# Patient Record
Sex: MALE | Race: BLACK OR AFRICAN AMERICAN | NOT HISPANIC OR LATINO | URBAN - METROPOLITAN AREA
[De-identification: names, ages, dates, MRNs, and addresses within clinical notes are randomized per-mention and may not be internally consistent; named-entity substitution may affect disease eponyms.]

---

## 2022-06-27 ENCOUNTER — OFFICE VISIT (OUTPATIENT)
Dept: UROLOGY | Facility: CLINIC | Age: 77
End: 2022-06-27

## 2022-06-27 VITALS
BODY MASS INDEX: 25.01 KG/M2 | WEIGHT: 165 LBS | HEIGHT: 68 IN | TEMPERATURE: 97.3 F | SYSTOLIC BLOOD PRESSURE: 117 MMHG | DIASTOLIC BLOOD PRESSURE: 74 MMHG | HEART RATE: 70 BPM

## 2022-06-27 DIAGNOSIS — R97.20 ELEVATED PROSTATE SPECIFIC ANTIGEN (PSA): Primary | ICD-10-CM

## 2022-06-27 DIAGNOSIS — N40.2 PROSTATIC NODULE: ICD-10-CM

## 2022-06-27 LAB
BILIRUB BLD-MCNC: NEGATIVE MG/DL
CLARITY, POC: CLEAR
COLOR UR: YELLOW
GLUCOSE UR STRIP-MCNC: NEGATIVE MG/DL
KETONES UR QL: NEGATIVE
LEUKOCYTE EST, POC: NEGATIVE
NITRITE UR-MCNC: NEGATIVE MG/ML
PH UR: 6 [PH] (ref 5–8)
PROT UR STRIP-MCNC: ABNORMAL MG/DL
RBC # UR STRIP: NEGATIVE /UL
SP GR UR: 1.02 (ref 1–1.03)
UROBILINOGEN UR QL: ABNORMAL

## 2022-06-27 PROCEDURE — 87081 CULTURE SCREEN ONLY: CPT | Performed by: UROLOGY

## 2022-06-27 PROCEDURE — 81002 URINALYSIS NONAUTO W/O SCOPE: CPT | Performed by: UROLOGY

## 2022-06-27 PROCEDURE — 99203 OFFICE O/P NEW LOW 30 MIN: CPT | Performed by: UROLOGY

## 2022-06-27 RX ORDER — TRAMADOL HYDROCHLORIDE 50 MG/1
100 TABLET ORAL 3 TIMES DAILY
COMMUNITY

## 2022-06-27 RX ORDER — CELECOXIB 200 MG/1
200 CAPSULE ORAL DAILY
COMMUNITY

## 2022-06-27 RX ORDER — DIPHENHYDRAMINE HCL 25 MG
25 CAPSULE ORAL NIGHTLY PRN
COMMUNITY

## 2022-06-27 RX ORDER — ATORVASTATIN CALCIUM 80 MG/1
80 TABLET, FILM COATED ORAL DAILY
COMMUNITY

## 2022-06-27 RX ORDER — GABAPENTIN 600 MG/1
600 TABLET ORAL 3 TIMES DAILY
COMMUNITY

## 2022-06-27 RX ORDER — TRAZODONE HYDROCHLORIDE 50 MG/1
50 TABLET ORAL DAILY PRN
COMMUNITY

## 2022-06-27 RX ORDER — ATORVASTATIN CALCIUM 10 MG/1
10 TABLET, FILM COATED ORAL DAILY
COMMUNITY
End: 2022-06-27 | Stop reason: SDUPTHER

## 2022-06-27 RX ORDER — DULOXETIN HYDROCHLORIDE 60 MG/1
60 CAPSULE, DELAYED RELEASE ORAL DAILY
COMMUNITY

## 2022-06-27 NOTE — PROGRESS NOTES
"Chief Complaint  Elevated PSA    Prostatic nodule    Exposure to agent orange in [place]    Subjective  No acute distress        Dwight Gooden presents to [facility] UROLOGY  History of Present Illness    76-year-old -American male is seen because of elevated PSA to 9.2 and the prostatic nodule.  Patient was exposed to agent orange in [place].  He has no history of prostate cancer in the family.  Patient has no voiding difficulties and AUA score is only 1/35.  No gross hematuria or dysuria.  Patient was injured in [place] war and is reluctant to have any operations and pain    Objective No acute distress  Vital Signs:   /74   Pulse 70   Temp 97.3 °F (36.3 °C)   Ht 172.7 cm (68\")   Wt 74.8 kg (165 lb)   BMI 25.09 kg/m²     Allergies   Allergen Reactions   • Augmentin [Amoxicillin-Pot Clavulanate] Swelling     lips      Past medical history:  has a past medical history of Chronic pain, Hyperlipidemia, and Neuropathy.   Past surgical history:  has a past surgical history that includes Hip surgery (Bilateral); Knee surgery (Left); Abdominal surgery; Coccyx fracture surgery; and Hand nerve graft (Left).  Personal history: family history includes Diabetes in his mother; Emphysema in his father.  Social history:  reports that he has quit smoking. He has never used smokeless tobacco. Drug use questions deferred to the physician. He reports that he does not drink alcohol.    Review of Systems    Please see past medical and surgical history.  Patient was injured in [place] war and has been through a lot of operations    Physical Exam  Constitutional:       General: He is not in acute distress.     Appearance: Normal appearance. He is normal weight. He is not ill-appearing or toxic-appearing.   HENT:      Head: Normocephalic and atraumatic.      Ears:      Comments: No hearing loss  Eyes:      Pupils: Pupils are equal, round, and reactive to light.   Cardiovascular:      Rate and Rhythm: " Normal rate and regular rhythm.      Pulses: Normal pulses.      Heart sounds: Normal heart sounds. No murmur heard.  Pulmonary:      Effort: Pulmonary effort is normal.      Breath sounds: Normal breath sounds. No rhonchi or rales.   Abdominal:      General: Abdomen is flat.      Palpations: Abdomen is soft. There is no mass.      Tenderness: There is no abdominal tenderness. There is no right CVA tenderness or left CVA tenderness.   Genitourinary:     Comments: Normal circumcised penis.    Right and left scrotum is normal.    Right and left testicle and epididymis is normal.    MARIAM.  Patient has nodule on the left side extending to the lower part of the prostate on the right side.  It is hard and very suspicious for prostate cancer  Musculoskeletal:      Cervical back: Normal range of motion and neck supple. No rigidity or tenderness.   Lymphadenopathy:      Cervical: No cervical adenopathy.   Skin:     General: Skin is warm.      Coloration: Skin is not jaundiced.   Neurological:      General: No focal deficit present.      Mental Status: He is alert and oriented to person, place, and time.      Motor: No weakness.      Gait: Gait normal.   Psychiatric:         Mood and Affect: Mood normal.         Behavior: Behavior normal.         Thought Content: Thought content normal.         Judgment: Judgment normal.        Result Review :                 Assessment and Plan    Diagnoses and all orders for this visit:    1. Elevated prostate specific antigen (PSA) (Primary)  -     POC Urinalysis Dipstick  -     Fluoroquinolone-resistant Gram-negative Juan [place] Detection Culture - Swab, Large Intestine, Rectum    2. Prostatic nodule    I have done anal swab on him.  This is to make sure we do not have Levaquin resistant organisms.  I have given him Percocet 5/325 mg and Valium 5 mg p.o. to take it before his procedure which is scheduled for 8 July.  Patient will also take a Fleet enema about 3 hours before the procedure.  Patient  is not going to drive and somebody will bring him here for prostatic biopsy.  I have discussed with him about the prostate biopsy     Brief Urine Lab Results  (Last result in the past 365 days)      Color   Clarity   Blood   Leuk Est   Nitrite   Protein   CREAT   Urine HCG        06/27/22 1417 Yellow   Clear   Negative   Negative   Negative   30 mg/dL                  Follow Up   No follow-ups on file.  Patient was given instructions and counseling regarding his condition or for health maintenance advice. Please see specific information pulled into the AVS if appropriate.     Leda Mandujano MD

## 2022-07-02 LAB
BACTERIA ANAL CULT: NORMAL
BACTERIA ANAL CULT: NORMAL

## 2022-07-08 ENCOUNTER — PROCEDURE VISIT (OUTPATIENT)
Dept: UROLOGY | Facility: CLINIC | Age: 77
End: 2022-07-08

## 2022-07-08 VITALS
HEIGHT: 68 IN | SYSTOLIC BLOOD PRESSURE: 130 MMHG | TEMPERATURE: 98.2 F | WEIGHT: 290 LBS | DIASTOLIC BLOOD PRESSURE: 80 MMHG | HEART RATE: 83 BPM | BODY MASS INDEX: 43.95 KG/M2

## 2022-07-08 DIAGNOSIS — R97.20 ELEVATED PROSTATE SPECIFIC ANTIGEN (PSA): Primary | ICD-10-CM

## 2022-07-08 DIAGNOSIS — N40.2 PROSTATIC NODULE: ICD-10-CM

## 2022-07-08 PROCEDURE — 88342 IMHCHEM/IMCYTCHM 1ST ANTB: CPT | Performed by: UROLOGY

## 2022-07-08 PROCEDURE — 88305 TISSUE EXAM BY PATHOLOGIST: CPT | Performed by: UROLOGY

## 2022-07-08 NOTE — PROGRESS NOTES
Date.  7/8/2022    Preop diagnosis.  Elevated PSA and prostatic nodule    Postop diagnosis same    Operation performed.  Ultrasound of prostate and ultrasound directed biopsies.    Report of the procedure.  Patient was placed in left lateral position and ultrasound was done in axial and sagittal plane.  Patient was given 80 mg of gentamicin IM before the procedure.    Total volume of the prostate is 9.45 cm³.  Height is 20.7 mm and width is 37.6 mm.  Length is 23.3 mm.    Patient has a hypoechoic lesion at the left base extending through the right side.  Height of the lesion is 5.15 mm and weight is 20.4 mm.  Patient has very small transitional zones.    I have not have injection of 1% Xylocaine and half with epinephrine and injected between seminal vesicles and prostate.  I went ahead did 3 biopsies of this hypoechoic lesion and then routine biopsies of left base, left mid left apex doing 2 each biopsies.  Then we went ahead did 2 biopsies each from right base, right mid and right apex.    Rectal examination was done and there was not much bleeding.  We terminated the procedure and will see the patient in about 10 days for the report.  He tolerated her procedure very well.  I am going to give him Cipro 500 mg twice daily for 3 days.  Patient is a [place]  and was injured during the war and he request Percocet for pain which was given to him for postop pain in case he needs it.  We gave him 8 tablets.

## 2022-07-13 LAB
CYTO UR: NORMAL
LAB AP CASE REPORT: NORMAL
LAB AP CLINICAL INFORMATION: NORMAL
LAB AP SPECIAL STAINS: NORMAL
PATH REPORT.FINAL DX SPEC: NORMAL
PATH REPORT.GROSS SPEC: NORMAL

## 2022-07-19 ENCOUNTER — OFFICE VISIT (OUTPATIENT)
Dept: UROLOGY | Facility: CLINIC | Age: 77
End: 2022-07-19

## 2022-07-19 VITALS
DIASTOLIC BLOOD PRESSURE: 82 MMHG | SYSTOLIC BLOOD PRESSURE: 124 MMHG | TEMPERATURE: 98.6 F | BODY MASS INDEX: 25.01 KG/M2 | HEIGHT: 68 IN | HEART RATE: 86 BPM | WEIGHT: 165 LBS

## 2022-07-19 DIAGNOSIS — R97.20 ELEVATED PROSTATE SPECIFIC ANTIGEN (PSA): ICD-10-CM

## 2022-07-19 DIAGNOSIS — C61 PROSTATE CANCER: Primary | ICD-10-CM

## 2022-07-19 LAB
BILIRUB BLD-MCNC: NEGATIVE MG/DL
CLARITY, POC: CLEAR
COLOR UR: YELLOW
GLUCOSE UR STRIP-MCNC: NEGATIVE MG/DL
KETONES UR QL: ABNORMAL
LEUKOCYTE EST, POC: NEGATIVE
NITRITE UR-MCNC: NEGATIVE MG/ML
PH UR: 6 [PH] (ref 5–8)
PROT UR STRIP-MCNC: ABNORMAL MG/DL
RBC # UR STRIP: ABNORMAL /UL
SP GR UR: 1.02 (ref 1–1.03)
UROBILINOGEN UR QL: NORMAL

## 2022-07-19 PROCEDURE — 99212 OFFICE O/P EST SF 10 MIN: CPT | Performed by: UROLOGY

## 2022-07-19 PROCEDURE — 81002 URINALYSIS NONAUTO W/O SCOPE: CPT | Performed by: UROLOGY

## 2022-07-19 NOTE — PROGRESS NOTES
"Chief Complaint  Here for prostate biopsy results    Prostate cancer    Subjective No problem after the biopsy of prostate except gross hematuria for a few days        Dwight Gooden presents to [facility] UROLOGY  History of Present Illness    76-year-old -American male had elevated PSA and prostatic nodule.  Biopsy was performed and patient does have [place] score 4+3 prostate cancer in several biopsies.  Patient was exposed to agent orange in [place].  Patient has no dysuria    Objective No acute distress  Vital Signs:   /82   Pulse 86   Temp 98.6 °F (37 °C) (Infrared)   Ht 172.7 cm (68\")   Wt 74.8 kg (165 lb)   BMI 25.09 kg/m²     Allergies   Allergen Reactions   • Augmentin [Amoxicillin-Pot Clavulanate] Swelling     lips      Past medical history:  has a past medical history of Chronic pain, Hyperlipidemia, Neuropathy, and Prostate cancer (HCC).   Past surgical history:  has a past surgical history that includes Hip surgery (Bilateral); Knee surgery (Left); Abdominal surgery; Coccyx fracture surgery; Hand nerve graft (Left); and Prostate biopsy.  Personal history: family history includes Diabetes in his mother; Emphysema in his father.  Social history:  reports that he has quit smoking. He has never used smokeless tobacco. Drug use questions deferred to the physician. He reports that he does not drink alcohol.    Review of Systems    Please see past medical and surgical history rest of the system is negative    Physical Exam  Constitutional:       General: He is not in acute distress.     Appearance: Normal appearance. He is normal weight. He is not ill-appearing or toxic-appearing.   HENT:      Head: Normocephalic and atraumatic.   Skin:     General: Skin is warm.      Coloration: Skin is not jaundiced.   Neurological:      General: No focal deficit present.      Mental Status: He is alert and oriented to person, place, and time.      Gait: Gait normal.   Psychiatric:    "      Mood and Affect: Mood normal.         Behavior: Behavior normal.         Thought Content: Thought content normal.         Judgment: Judgment normal.        Result Review :         Status: Final result     Visible to patient: No (not released)     Next appt: None     Dx: Elevated prostate specific antigen (PSA)    Specimen Information: A: Prostate; Tissue    B: Prostate; Tissue    C: Prostate; Tissue    D: Prostate; Tissue    E: Prostate; Tissue    F: Prostate; Tissue    G: Prostate; Tissue         0 Result Notes    Component    Case Report   Surgical Pathology Report                         Case: [id number]                                   Authorizing Provider:  Leda Mandujano MD  Collected:           07/08/2022 04:37 PM           Ordering Location:     [facility]     Received:            07/08/2022 04:37 PM                                  [facility]                                                                 Pathologist:           Sherrill Moreira DO                                                        Specimens:   1) - Prostate, left hypoecohic area x 3 samples                                                      2) - Prostate, left base x 2 samples                                                                 3) - Prostate, left mid x 2 samples                                                                  4) - Prostate, left apex x 2 samples                                                                 5) - Prostate, right base x 2 samples                                                                6) - Prostate, right mid x 2 samples                                                                 7) - Prostate, right apex x 2 samples                                                      Clinical Information    Elevated prostate specific antigen (PSA)   Final Diagnosis   1. Prostate gland,  left hypoechoic area , needle core biopsy:               - Adenocarcinoma,  Grade Group 2 (Dwale grade 3+4 = score of 7), in 3 of 3 cores, involving 100% of needle core tissue, and measuring 12 mm in length               - Percent pattern 4: 40 %        2. Prostate gland, left base, needle core biopsy:               - Adenocarcinoma, Grade Group 3 (Dwale grade 4+3 = score of 7), in 2 of 2 cores, involving 100% of needle core tissue, and measuring 10 mm in length               - Percent pattern 4: 80%        3. Prostate gland, left mid, needle core biopsy:               - Adenocarcinoma, Grade Group 3 (Dwale grade 4+3 = score of 7), in 2 of 2 cores, involving 95% of needle core tissue, and measuring 10 mm in length               - Percent pattern 4: 60%         4. Prostate gland, left apex, needle core biopsy:               - Adenocarcinoma, Grade Group 2 (Dwale grade 4+3 = score of 7), in 2 of 2 cores, involving 96% of needle core tissue, and measuring 15 mm in length               - Percent pattern 4: 70%        5. Prostate gland, right base, needle core biopsy:               - Adenocarcinoma, Grade Group 1 (Dwale grade 3+3 = score of 6), in 1 of 2 cores, involving 13% of needle core tissue, and measuring 3 mm in length         6. Prostate gland, right mid, needle core biopsy:               - Adenocarcinoma, Grade Group 2 (Vasu grade 3+4 = score of 7), in 2 of 2 cores, involving 50% of needle core tissue, and measuring 6 mm in length               - Percent pattern 4: 5%         7. Prostate gland, right apex, needle core biopsy:               - Adenocarcinoma, Grade Group 1 (Vasu grade 3+3 = score of 6), in 1 of 2 cores, involving 4% of needle core tissue, and measuring 1 mm in length      REMARKS: The above positive (malignant) diagnosis was called to Arabella in Dr. Mandujano's office at 13:23 EDT on 7/13/2022 by s.       Electronically signed by Sherrill Moreira DO on 7/13/2022 at 1416   Gross Description    1. Prostate.  Left hypoechoic area x3: Received in formalin are 3  needle core biopsies of white semitranslucent soft tissue measuring up to 1.5 cm in length and each measuring less than 0.1 cm in diameter.  All 1A-1B.        2. Prostate.  Left base x2 samples: Received in formalin are 2 needle core biopsies of white semitranslucent soft tissue measuring up to 1.4 cm in length and each measuring less than 0.1 cm in diameter.  All 2A.        3. Prostate.  Left mid x2 samples: Received in formalin are 2 needle core biopsies of white semitranslucent soft tissue measuring up to 1.4 cm in length and each measuring less than 0.1 cm in diameter.  All 3A.        4. Prostate.  Left apex x2 samples: Received in formalin are 2 needle core biopsies of white semitranslucent soft tissue measuring up to 2.2 cm in length and each measuring less than 0.1 cm in diameter.  All 4A.        5. Prostate.  Right base x2 samples: Received in formalin are 2 needle core biopsies of white semitranslucent soft tissue measuring up to 1.7 cm in length and each measuring less than 0.1 cm in diameter.  All 5A.        6. Prostate.  Right mid x2 samples.  Received in formalin are 2 needle core biopsies of white semitranslucent soft tissue measuring up to 1.7 cm in length and each measuring less than 0.1 cm in diameter.  All 6A.        7. Prostate.  Right apex x2 samples.  Received in formalin are 2 needle core biopsies of white semitranslucent soft tissue measuring up to 2 cm in length and each measuring less than 0.1 cm in diameter.  All 7A.  CRE         Special Stains    Immunostain for p63 is performed on block 5 a and demonstrates absence of myoepithelial cells surrounding invasive carcinoma.  All immunohistochemical/cytochemical stains (IHC) are performed on separate slides per different antibody unless otherwise specified in the documentation that a cocktail (multiple stain) was performed.  Controls are appropriate.      Microscopic Description    Comment:    Microscopic examination performed                  Assessment and Plan    Diagnoses and all orders for this visit:    1. Prostate cancer (HCC) (Primary)    2. Elevated prostate specific antigen (PSA)  -     POC Urinalysis Dipstick      I have discussed surgical treatment, radiation treatment for his prostate cancer.  He is going to go and talk to his PCP tomorrow.  I have given him copies of his pathology report.  Is going to think about it and let me know.  Brief Urine Lab Results  (Last result in the past 365 days)      Color   Clarity   Blood   Leuk Est   Nitrite   Protein   CREAT   Urine HCG        07/19/22 1601 Yellow   Clear   Trace   Negative   Negative   30 mg/dL                  Follow Up   No follow-ups on file.  Patient was given instructions and counseling regarding his condition or for health maintenance advice. Please see specific information pulled into the AVS if appropriate.     Leda Mandujano MD

## 2022-07-26 ENCOUNTER — OFFICE VISIT (OUTPATIENT)
Dept: UROLOGY | Facility: CLINIC | Age: 77
End: 2022-07-26

## 2022-07-26 VITALS
HEART RATE: 69 BPM | DIASTOLIC BLOOD PRESSURE: 77 MMHG | WEIGHT: 165 LBS | TEMPERATURE: 98.4 F | BODY MASS INDEX: 25.01 KG/M2 | SYSTOLIC BLOOD PRESSURE: 121 MMHG | HEIGHT: 68 IN

## 2022-07-26 DIAGNOSIS — Z77.098 AGENT ORANGE EXPOSURE: ICD-10-CM

## 2022-07-26 DIAGNOSIS — C61 PROSTATE CANCER: Primary | ICD-10-CM

## 2022-07-26 PROCEDURE — 99212 OFFICE O/P EST SF 10 MIN: CPT | Performed by: UROLOGY

## 2022-07-26 NOTE — PROGRESS NOTES
"Chief Complaint  Prostate Cancer    Subjective  No acute distress        Dwight Gooden presents to [facility] UROLOGY  History of Present Illness    Patient PSA is 9.2 and has Vasu score 7 prostate cancer.  His biopsy showed Vasu grade 4 +3 x 3, 3 +4 x 2 and 3 +3 x 2.  Patient is sexually active and he has a fiancé and wants to retain his erection.  He has done some studies and he understands that radical surgery will affect his erection.  We have discussed radiation and CyberKnife.    Patient was exposed to agent orange in [place]    Objective No acute distress  Vital Signs:   /77   Pulse 69   Temp 98.4 °F (36.9 °C)   Ht 172.7 cm (68\")   Wt 74.8 kg (165 lb)   BMI 25.09 kg/m²     Allergies   Allergen Reactions   • Augmentin [Amoxicillin-Pot Clavulanate] Swelling     lips      Past medical history:  has a past medical history of Chronic pain, Hyperlipidemia, Neuropathy, and Prostate cancer (HCC).   Past surgical history:  has a past surgical history that includes Hip surgery (Bilateral); Knee surgery (Left); Abdominal surgery; Coccyx fracture surgery; Hand nerve graft (Left); and Prostate biopsy.  Personal history: family history includes Diabetes in his mother; Emphysema in his father.  Social history:  reports that he has quit smoking. He has never used smokeless tobacco. Drug use questions deferred to the physician. He reports that he does not drink alcohol.    Review of Systems    No change from before    Physical Exam  Constitutional:       General: He is not in acute distress.     Appearance: Normal appearance. He is normal weight. He is not ill-appearing or toxic-appearing.   HENT:      Head: Normocephalic and atraumatic.      Ears:      Comments: No hearing loss  Musculoskeletal:         General: Normal range of motion.      Right lower leg: No edema.      Left lower leg: No edema.   Skin:     General: Skin is warm.      Coloration: Skin is not jaundiced.   Neurological: "      General: No focal deficit present.      Mental Status: He is alert and oriented to person, place, and time.      Motor: No weakness.      Gait: Gait normal.   Psychiatric:         Mood and Affect: Mood normal.         Behavior: Behavior normal.         Thought Content: Thought content normal.         Judgment: Judgment normal.        Result Review :                 Assessment and Plan    Diagnoses and all orders for this visit:    1. Prostate cancer (HCC) (Primary)    2. Agent orange exposure      Discussed radical prostatectomy or radiation.  Patient wanted know about brachytherapy and we discussed that also.  We discussed CyberKnife and patient is extremely interested in CyberKnife to preserve his reaction at least for some more time.  We will refer him to [facility] for CyberKnife.  Brief Urine Lab Results  (Last result in the past 365 days)      Color   Clarity   Blood   Leuk Est   Nitrite   Protein   CREAT   Urine HCG        07/19/22 1601 Yellow   Clear   Trace   Negative   Negative   30 mg/dL                  Follow Up   No follow-ups on file.  Patient was given instructions and counseling regarding his condition or for health maintenance advice. Please see specific information pulled into the AVS if appropriate.     Leda Mandujano MD

## 2023-08-21 ENCOUNTER — TRANSCRIBE ORDERS (OUTPATIENT)
Dept: ADMINISTRATIVE | Facility: HOSPITAL | Age: 78
End: 2023-08-21
Payer: OTHER GOVERNMENT

## 2023-08-21 DIAGNOSIS — Z13.820 ENCOUNTER FOR SCREENING FOR OSTEOPOROSIS: Primary | ICD-10-CM

## 2023-10-16 ENCOUNTER — HOSPITAL ENCOUNTER (OUTPATIENT)
Dept: BONE DENSITY | Facility: HOSPITAL | Age: 78
Discharge: HOME | End: 2023-10-16
Admitting: INTERNAL MEDICINE
Payer: MEDICARE

## 2023-10-16 DIAGNOSIS — Z13.820 ENCOUNTER FOR SCREENING FOR OSTEOPOROSIS: ICD-10-CM

## 2023-10-16 PROCEDURE — 77080 DXA BONE DENSITY AXIAL: CPT

## 2025-04-08 ENCOUNTER — HOSPITAL ENCOUNTER (EMERGENCY)
Facility: HOSPITAL | Age: 80
Discharge: HOME | End: 2025-04-08
Attending: EMERGENCY MEDICINE | Admitting: EMERGENCY MEDICINE
Payer: MEDICARE

## 2025-04-08 ENCOUNTER — APPOINTMENT (OUTPATIENT)
Dept: GENERAL RADIOLOGY | Facility: HOSPITAL | Age: 80
End: 2025-04-08
Payer: MEDICARE

## 2025-04-08 VITALS
RESPIRATION RATE: 18 BRPM | WEIGHT: 173.72 LBS | SYSTOLIC BLOOD PRESSURE: 111 MMHG | BODY MASS INDEX: 26.33 KG/M2 | OXYGEN SATURATION: 99 % | HEIGHT: 68 IN | TEMPERATURE: 98.9 F | DIASTOLIC BLOOD PRESSURE: 72 MMHG | HEART RATE: 87 BPM

## 2025-04-08 DIAGNOSIS — J98.8 VIRAL RESPIRATORY ILLNESS: Primary | ICD-10-CM

## 2025-04-08 DIAGNOSIS — B97.89 VIRAL RESPIRATORY ILLNESS: Primary | ICD-10-CM

## 2025-04-08 LAB
FLUAV RNA RESP QL NAA+PROBE: NOT DETECTED
FLUBV RNA RESP QL NAA+PROBE: NOT DETECTED
RSV RNA RESP QL NAA+PROBE: NOT DETECTED
SARS-COV-2 RNA RESP QL NAA+PROBE: NOT DETECTED

## 2025-04-08 PROCEDURE — 25010000002 DEXAMETHASONE SODIUM PHOSPHATE 10 MG/ML SOLUTION

## 2025-04-08 PROCEDURE — 96372 THER/PROPH/DIAG INJ SC/IM: CPT

## 2025-04-08 PROCEDURE — 87637 SARSCOV2&INF A&B&RSV AMP PRB: CPT | Performed by: EMERGENCY MEDICINE

## 2025-04-08 PROCEDURE — 71045 X-RAY EXAM CHEST 1 VIEW: CPT

## 2025-04-08 PROCEDURE — 99283 EMERGENCY DEPT VISIT LOW MDM: CPT

## 2025-04-08 RX ORDER — DEXAMETHASONE SODIUM PHOSPHATE 10 MG/ML
10 INJECTION, SOLUTION INTRAMUSCULAR; INTRAVENOUS ONCE
Status: COMPLETED | OUTPATIENT
Start: 2025-04-08 | End: 2025-04-08

## 2025-04-08 RX ORDER — METHYLPREDNISOLONE 4 MG/1
TABLET ORAL
Qty: 21 TABLET | Refills: 0 | Status: SHIPPED | OUTPATIENT
Start: 2025-04-08

## 2025-04-08 RX ADMIN — DEXAMETHASONE SODIUM PHOSPHATE 10 MG: 10 INJECTION INTRAMUSCULAR; INTRAVENOUS at 18:48
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
## 2025-05-29 ENCOUNTER — HOSPITAL ENCOUNTER (INPATIENT)
Facility: HOSPITAL | Age: 80
LOS: 7 days | Discharge: HOME | DRG: 378 | End: 2025-06-05
Attending: EMERGENCY MEDICINE | Admitting: HOSPITALIST
Payer: MEDICARE

## 2025-05-29 ENCOUNTER — APPOINTMENT (OUTPATIENT)
Dept: GENERAL RADIOLOGY | Facility: HOSPITAL | Age: 80
DRG: 378 | End: 2025-05-29
Payer: MEDICARE

## 2025-05-29 DIAGNOSIS — R26.2 DIFFICULTY IN WALKING: ICD-10-CM

## 2025-05-29 DIAGNOSIS — E86.1 HYPOTENSION DUE TO HYPOVOLEMIA: ICD-10-CM

## 2025-05-29 DIAGNOSIS — K92.2 ACUTE UPPER GI BLEED: ICD-10-CM

## 2025-05-29 DIAGNOSIS — Z78.9 DECREASED ACTIVITIES OF DAILY LIVING (ADL): ICD-10-CM

## 2025-05-29 DIAGNOSIS — D62 ACUTE BLOOD LOSS ANEMIA: Primary | ICD-10-CM

## 2025-05-29 LAB
ABO GROUP BLD: NORMAL
ABO GROUP BLD: NORMAL
ALBUMIN SERPL-MCNC: 3.6 G/DL (ref 3.5–5.2)
ALBUMIN/GLOB SERPL: 2.1 G/DL
ALP SERPL-CCNC: 41 U/L (ref 39–117)
ALT SERPL W P-5'-P-CCNC: 11 U/L (ref 1–41)
ANION GAP SERPL CALCULATED.3IONS-SCNC: 10.8 MMOL/L (ref 5–15)
AST SERPL-CCNC: 16 U/L (ref 1–40)
BASOPHILS # BLD AUTO: 0.01 10*3/MM3 (ref 0–0.2)
BASOPHILS NFR BLD AUTO: 0.1 % (ref 0–1.5)
BILIRUB SERPL-MCNC: 0.4 MG/DL (ref 0–1.2)
BILIRUB UR QL STRIP: NEGATIVE
BLD GP AB SCN SERPL QL: NEGATIVE
BUN SERPL-MCNC: 35.5 MG/DL (ref 8–23)
BUN/CREAT SERPL: 29.3 (ref 7–25)
CALCIUM SPEC-SCNC: 8.8 MG/DL (ref 8.6–10.5)
CHLORIDE SERPL-SCNC: 111 MMOL/L (ref 98–107)
CLARITY UR: CLEAR
CO2 SERPL-SCNC: 20.2 MMOL/L (ref 22–29)
COLOR UR: YELLOW
CREAT SERPL-MCNC: 1.21 MG/DL (ref 0.76–1.27)
DEPRECATED RDW RBC AUTO: 52.1 FL (ref 37–54)
EGFRCR SERPLBLD CKD-EPI 2021: 61.7 ML/MIN/1.73
EOSINOPHIL # BLD AUTO: 0.06 10*3/MM3 (ref 0–0.4)
EOSINOPHIL NFR BLD AUTO: 0.9 % (ref 0.3–6.2)
ERYTHROCYTE [DISTWIDTH] IN BLOOD BY AUTOMATED COUNT: 14.6 % (ref 12.3–15.4)
GEN 5 1HR TROPONIN T REFLEX: 12 NG/L
GLOBULIN UR ELPH-MCNC: 1.7 GM/DL
GLUCOSE SERPL-MCNC: 109 MG/DL (ref 65–99)
GLUCOSE UR STRIP-MCNC: NEGATIVE MG/DL
HCT VFR BLD AUTO: 15.7 % (ref 37.5–51)
HEMOCCULT STL QL IA: POSITIVE
HGB BLD-MCNC: 5 G/DL (ref 13–17.7)
HGB UR QL STRIP.AUTO: NEGATIVE
HOLD SPECIMEN: NORMAL
HOLD SPECIMEN: NORMAL
IMM GRANULOCYTES # BLD AUTO: 0.04 10*3/MM3 (ref 0–0.05)
IMM GRANULOCYTES NFR BLD AUTO: 0.6 % (ref 0–0.5)
INR PPP: 1.13 (ref 0.86–1.15)
KETONES UR QL STRIP: ABNORMAL
LEUKOCYTE ESTERASE UR QL STRIP.AUTO: NEGATIVE
LYMPHOCYTES # BLD AUTO: 1.35 10*3/MM3 (ref 0.7–3.1)
LYMPHOCYTES NFR BLD AUTO: 19.2 % (ref 19.6–45.3)
MAGNESIUM SERPL-MCNC: 2 MG/DL (ref 1.6–2.4)
MCH RBC QN AUTO: 31.3 PG (ref 26.6–33)
MCHC RBC AUTO-ENTMCNC: 31.8 G/DL (ref 31.5–35.7)
MCV RBC AUTO: 98.1 FL (ref 79–97)
MONOCYTES # BLD AUTO: 0.48 10*3/MM3 (ref 0.1–0.9)
MONOCYTES NFR BLD AUTO: 6.8 % (ref 5–12)
NEUTROPHILS NFR BLD AUTO: 5.08 10*3/MM3 (ref 1.7–7)
NEUTROPHILS NFR BLD AUTO: 72.4 % (ref 42.7–76)
NITRITE UR QL STRIP: NEGATIVE
NRBC BLD AUTO-RTO: 0 /100 WBC (ref 0–0.2)
PH UR STRIP.AUTO: 6.5 [PH] (ref 5–8)
PHOSPHATE SERPL-MCNC: 2.3 MG/DL (ref 2.5–4.5)
PLATELET # BLD AUTO: 165 10*3/MM3 (ref 140–450)
PMV BLD AUTO: 9.3 FL (ref 6–12)
POTASSIUM SERPL-SCNC: 3.6 MMOL/L (ref 3.5–5.2)
PROT SERPL-MCNC: 5.3 G/DL (ref 6–8.5)
PROT UR QL STRIP: NEGATIVE
PROTHROMBIN TIME: 15 SECONDS (ref 11.8–14.9)
RBC # BLD AUTO: 1.6 10*6/MM3 (ref 4.14–5.8)
RH BLD: POSITIVE
RH BLD: POSITIVE
SODIUM SERPL-SCNC: 142 MMOL/L (ref 136–145)
SP GR UR STRIP: 1.02 (ref 1–1.03)
T&S EXPIRATION DATE: NORMAL
TROPONIN T NUMERIC DELTA: -1 NG/L
TROPONIN T SERPL HS-MCNC: 13 NG/L
UROBILINOGEN UR QL STRIP: ABNORMAL
WBC NRBC COR # BLD AUTO: 7.02 10*3/MM3 (ref 3.4–10.8)
WHOLE BLOOD HOLD COAG: NORMAL
WHOLE BLOOD HOLD SPECIMEN: NORMAL

## 2025-05-29 PROCEDURE — 85025 COMPLETE CBC W/AUTO DIFF WBC: CPT | Performed by: EMERGENCY MEDICINE

## 2025-05-29 PROCEDURE — 84100 ASSAY OF PHOSPHORUS: CPT | Performed by: HOSPITALIST

## 2025-05-29 PROCEDURE — 93005 ELECTROCARDIOGRAM TRACING: CPT

## 2025-05-29 PROCEDURE — 86901 BLOOD TYPING SEROLOGIC RH(D): CPT

## 2025-05-29 PROCEDURE — 86850 RBC ANTIBODY SCREEN: CPT

## 2025-05-29 PROCEDURE — 93010 ELECTROCARDIOGRAM REPORT: CPT | Performed by: INTERNAL MEDICINE

## 2025-05-29 PROCEDURE — 99291 CRITICAL CARE FIRST HOUR: CPT

## 2025-05-29 PROCEDURE — 25810000003 SODIUM CHLORIDE 0.9 % SOLUTION: Performed by: EMERGENCY MEDICINE

## 2025-05-29 PROCEDURE — 86900 BLOOD TYPING SEROLOGIC ABO: CPT

## 2025-05-29 PROCEDURE — 86923 COMPATIBILITY TEST ELECTRIC: CPT

## 2025-05-29 PROCEDURE — 36430 TRANSFUSION BLD/BLD COMPNT: CPT

## 2025-05-29 PROCEDURE — 99222 1ST HOSP IP/OBS MODERATE 55: CPT | Performed by: HOSPITALIST

## 2025-05-29 PROCEDURE — 80053 COMPREHEN METABOLIC PANEL: CPT | Performed by: EMERGENCY MEDICINE

## 2025-05-29 PROCEDURE — 25010000002 OCTREOTIDE PER 25 MCG: Performed by: EMERGENCY MEDICINE

## 2025-05-29 PROCEDURE — 71045 X-RAY EXAM CHEST 1 VIEW: CPT

## 2025-05-29 PROCEDURE — P9016 RBC LEUKOCYTES REDUCED: HCPCS

## 2025-05-29 PROCEDURE — 82274 ASSAY TEST FOR BLOOD FECAL: CPT | Performed by: EMERGENCY MEDICINE

## 2025-05-29 PROCEDURE — 81003 URINALYSIS AUTO W/O SCOPE: CPT | Performed by: EMERGENCY MEDICINE

## 2025-05-29 PROCEDURE — 83735 ASSAY OF MAGNESIUM: CPT | Performed by: EMERGENCY MEDICINE

## 2025-05-29 PROCEDURE — 36415 COLL VENOUS BLD VENIPUNCTURE: CPT

## 2025-05-29 PROCEDURE — 93005 ELECTROCARDIOGRAM TRACING: CPT | Performed by: EMERGENCY MEDICINE

## 2025-05-29 PROCEDURE — 84484 ASSAY OF TROPONIN QUANT: CPT | Performed by: EMERGENCY MEDICINE

## 2025-05-29 PROCEDURE — 85610 PROTHROMBIN TIME: CPT | Performed by: EMERGENCY MEDICINE

## 2025-05-29 RX ORDER — BISACODYL 10 MG
10 SUPPOSITORY, RECTAL RECTAL DAILY PRN
Status: DISCONTINUED | OUTPATIENT
Start: 2025-05-29 | End: 2025-06-05 | Stop reason: HOSPADM

## 2025-05-29 RX ORDER — ACETAMINOPHEN 325 MG/1
650 TABLET ORAL EVERY 4 HOURS PRN
Status: DISCONTINUED | OUTPATIENT
Start: 2025-05-29 | End: 2025-06-05 | Stop reason: HOSPADM

## 2025-05-29 RX ORDER — NIFEDIPINE 30 MG
30 TABLET, EXTENDED RELEASE ORAL DAILY
COMMUNITY

## 2025-05-29 RX ORDER — PREDNISONE 5 MG/1
5 TABLET ORAL NIGHTLY
COMMUNITY

## 2025-05-29 RX ORDER — ACETAMINOPHEN 650 MG/1
650 SUPPOSITORY RECTAL EVERY 4 HOURS PRN
Status: DISCONTINUED | OUTPATIENT
Start: 2025-05-29 | End: 2025-06-05 | Stop reason: HOSPADM

## 2025-05-29 RX ORDER — PANTOPRAZOLE SODIUM 40 MG/10ML
80 INJECTION, POWDER, LYOPHILIZED, FOR SOLUTION INTRAVENOUS ONCE
Status: COMPLETED | OUTPATIENT
Start: 2025-05-29 | End: 2025-05-29

## 2025-05-29 RX ORDER — POLYETHYLENE GLYCOL 3350 17 G/17G
17 POWDER, FOR SOLUTION ORAL DAILY PRN
Status: DISCONTINUED | OUTPATIENT
Start: 2025-05-29 | End: 2025-06-05 | Stop reason: HOSPADM

## 2025-05-29 RX ORDER — ALBUTEROL SULFATE 90 UG/1
2 INHALANT RESPIRATORY (INHALATION) EVERY 4 HOURS PRN
Status: DISCONTINUED | OUTPATIENT
Start: 2025-05-29 | End: 2025-05-29

## 2025-05-29 RX ORDER — OXYCODONE AND ACETAMINOPHEN 5; 325 MG/1; MG/1
1 TABLET ORAL EVERY 6 HOURS PRN
Refills: 0 | Status: DISPENSED | OUTPATIENT
Start: 2025-05-29 | End: 2025-06-03

## 2025-05-29 RX ORDER — AMOXICILLIN 250 MG
2 CAPSULE ORAL 2 TIMES DAILY PRN
Status: DISCONTINUED | OUTPATIENT
Start: 2025-05-29 | End: 2025-06-05 | Stop reason: HOSPADM

## 2025-05-29 RX ORDER — ALBUTEROL SULFATE 0.83 MG/ML
2.5 SOLUTION RESPIRATORY (INHALATION) EVERY 6 HOURS PRN
Status: DISCONTINUED | OUTPATIENT
Start: 2025-05-29 | End: 2025-06-05 | Stop reason: HOSPADM

## 2025-05-29 RX ORDER — ONDANSETRON 4 MG/1
4 TABLET, ORALLY DISINTEGRATING ORAL EVERY 6 HOURS PRN
Status: DISCONTINUED | OUTPATIENT
Start: 2025-05-29 | End: 2025-06-05 | Stop reason: HOSPADM

## 2025-05-29 RX ORDER — ONDANSETRON 2 MG/ML
4 INJECTION INTRAMUSCULAR; INTRAVENOUS EVERY 6 HOURS PRN
Status: DISCONTINUED | OUTPATIENT
Start: 2025-05-29 | End: 2025-06-05 | Stop reason: HOSPADM

## 2025-05-29 RX ORDER — BISACODYL 5 MG/1
5 TABLET, DELAYED RELEASE ORAL DAILY PRN
Status: DISCONTINUED | OUTPATIENT
Start: 2025-05-29 | End: 2025-06-05 | Stop reason: HOSPADM

## 2025-05-29 RX ORDER — CELECOXIB 100 MG/1
200 CAPSULE ORAL DAILY
Status: DISCONTINUED | OUTPATIENT
Start: 2025-05-30 | End: 2025-06-03

## 2025-05-29 RX ORDER — ABIRATERONE ACETATE 250 MG/1
1000 TABLET ORAL NIGHTLY
COMMUNITY

## 2025-05-29 RX ORDER — OCTREOTIDE ACETATE 50 UG/ML
50 INJECTION, SOLUTION INTRAVENOUS; SUBCUTANEOUS ONCE
Status: COMPLETED | OUTPATIENT
Start: 2025-05-29 | End: 2025-05-29

## 2025-05-29 RX ORDER — SODIUM CHLORIDE 0.9 % (FLUSH) 0.9 %
10 SYRINGE (ML) INJECTION EVERY 12 HOURS SCHEDULED
Status: DISCONTINUED | OUTPATIENT
Start: 2025-05-29 | End: 2025-06-05 | Stop reason: HOSPADM

## 2025-05-29 RX ORDER — SODIUM CHLORIDE 0.9 % (FLUSH) 0.9 %
10 SYRINGE (ML) INJECTION AS NEEDED
Status: DISCONTINUED | OUTPATIENT
Start: 2025-05-29 | End: 2025-06-05 | Stop reason: HOSPADM

## 2025-05-29 RX ORDER — NALOXONE HCL 0.4 MG/ML
0.4 VIAL (ML) INJECTION
Status: DISCONTINUED | OUTPATIENT
Start: 2025-05-29 | End: 2025-06-05 | Stop reason: HOSPADM

## 2025-05-29 RX ORDER — OXYCODONE AND ACETAMINOPHEN 5; 325 MG/1; MG/1
1 TABLET ORAL EVERY 8 HOURS PRN
Status: DISCONTINUED | OUTPATIENT
Start: 2025-05-29 | End: 2025-06-03 | Stop reason: SDUPTHER

## 2025-05-29 RX ORDER — SODIUM CHLORIDE 9 MG/ML
40 INJECTION, SOLUTION INTRAVENOUS AS NEEDED
Status: DISCONTINUED | OUTPATIENT
Start: 2025-05-29 | End: 2025-06-05 | Stop reason: HOSPADM

## 2025-05-29 RX ORDER — ACETAMINOPHEN 160 MG/5ML
650 SOLUTION ORAL EVERY 4 HOURS PRN
Status: DISCONTINUED | OUTPATIENT
Start: 2025-05-29 | End: 2025-06-05 | Stop reason: HOSPADM

## 2025-05-29 RX ADMIN — OXYCODONE HYDROCHLORIDE AND ACETAMINOPHEN 1 TABLET: 5; 325 TABLET ORAL at 19:51

## 2025-05-29 RX ADMIN — SODIUM CHLORIDE 1000 ML: 9 INJECTION, SOLUTION INTRAVENOUS at 16:26

## 2025-05-29 RX ADMIN — OCTREOTIDE ACETATE 50 MCG: 50 INJECTION, SOLUTION INTRAVENOUS; SUBCUTANEOUS at 17:44

## 2025-05-29 RX ADMIN — OCTREOTIDE ACETATE 25 MCG/HR: 500 INJECTION, SOLUTION INTRAVENOUS; SUBCUTANEOUS at 17:56

## 2025-05-29 RX ADMIN — PANTOPRAZOLE SODIUM 80 MG: 40 INJECTION, POWDER, FOR SOLUTION INTRAVENOUS at 17:40

## 2025-05-29 RX ADMIN — Medication 10 ML: at 21:54
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
## 2025-05-29 NOTE — ED TRIAGE NOTES
Patient to ED from home with SOB upon exertion that causes him to feel dizzy.  He also reports blood in his stool.
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
## 2025-05-30 ENCOUNTER — ANESTHESIA (OUTPATIENT)
Dept: GASTROENTEROLOGY | Facility: HOSPITAL | Age: 80
End: 2025-05-30
Payer: MEDICARE

## 2025-05-30 ENCOUNTER — ANESTHESIA EVENT (OUTPATIENT)
Dept: GASTROENTEROLOGY | Facility: HOSPITAL | Age: 80
End: 2025-05-30
Payer: MEDICARE

## 2025-05-30 PROBLEM — D62 ACUTE BLOOD LOSS ANEMIA: Status: ACTIVE | Noted: 2025-05-29

## 2025-05-30 LAB
ANION GAP SERPL CALCULATED.3IONS-SCNC: 8.1 MMOL/L (ref 5–15)
BASOPHILS # BLD AUTO: 0.04 10*3/MM3 (ref 0–0.2)
BASOPHILS NFR BLD AUTO: 0.6 % (ref 0–1.5)
BUN SERPL-MCNC: 29.9 MG/DL (ref 8–23)
BUN/CREAT SERPL: 26.5 (ref 7–25)
CALCIUM SPEC-SCNC: 7.4 MG/DL (ref 8.6–10.5)
CHLORIDE SERPL-SCNC: 116 MMOL/L (ref 98–107)
CO2 SERPL-SCNC: 16.9 MMOL/L (ref 22–29)
CREAT SERPL-MCNC: 1.13 MG/DL (ref 0.76–1.27)
DEPRECATED RDW RBC AUTO: 49.8 FL (ref 37–54)
EGFRCR SERPLBLD CKD-EPI 2021: 66.9 ML/MIN/1.73
EOSINOPHIL # BLD AUTO: 0.22 10*3/MM3 (ref 0–0.4)
EOSINOPHIL NFR BLD AUTO: 3.2 % (ref 0.3–6.2)
ERYTHROCYTE [DISTWIDTH] IN BLOOD BY AUTOMATED COUNT: 15.2 % (ref 12.3–15.4)
GLUCOSE SERPL-MCNC: 145 MG/DL (ref 65–99)
HCT VFR BLD AUTO: 22.4 % (ref 37.5–51)
HGB BLD-MCNC: 7.4 G/DL (ref 13–17.7)
IMM GRANULOCYTES # BLD AUTO: 0.04 10*3/MM3 (ref 0–0.05)
IMM GRANULOCYTES NFR BLD AUTO: 0.6 % (ref 0–0.5)
LYMPHOCYTES # BLD AUTO: 1.73 10*3/MM3 (ref 0.7–3.1)
LYMPHOCYTES NFR BLD AUTO: 25.1 % (ref 19.6–45.3)
MCH RBC QN AUTO: 30 PG (ref 26.6–33)
MCHC RBC AUTO-ENTMCNC: 33 G/DL (ref 31.5–35.7)
MCV RBC AUTO: 90.7 FL (ref 79–97)
MONOCYTES # BLD AUTO: 0.65 10*3/MM3 (ref 0.1–0.9)
MONOCYTES NFR BLD AUTO: 9.4 % (ref 5–12)
NEUTROPHILS NFR BLD AUTO: 4.22 10*3/MM3 (ref 1.7–7)
NEUTROPHILS NFR BLD AUTO: 61.1 % (ref 42.7–76)
NRBC BLD AUTO-RTO: 0.3 /100 WBC (ref 0–0.2)
PHOSPHATE SERPL-MCNC: 2.2 MG/DL (ref 2.5–4.5)
PLATELET # BLD AUTO: 123 10*3/MM3 (ref 140–450)
PMV BLD AUTO: 9.8 FL (ref 6–12)
POTASSIUM SERPL-SCNC: 3.7 MMOL/L (ref 3.5–5.2)
QT INTERVAL: 325 MS
QTC INTERVAL: 421 MS
RBC # BLD AUTO: 2.47 10*6/MM3 (ref 4.14–5.8)
SODIUM SERPL-SCNC: 141 MMOL/L (ref 136–145)
WBC NRBC COR # BLD AUTO: 6.9 10*3/MM3 (ref 3.4–10.8)

## 2025-05-30 PROCEDURE — 25010000003 DEXTROSE 5 % SOLUTION: Performed by: HOSPITALIST

## 2025-05-30 PROCEDURE — 84100 ASSAY OF PHOSPHORUS: CPT | Performed by: HOSPITALIST

## 2025-05-30 PROCEDURE — 86900 BLOOD TYPING SEROLOGIC ABO: CPT

## 2025-05-30 PROCEDURE — 43255 EGD CONTROL BLEEDING ANY: CPT | Performed by: INTERNAL MEDICINE

## 2025-05-30 PROCEDURE — 94799 UNLISTED PULMONARY SVC/PX: CPT

## 2025-05-30 PROCEDURE — 25010000002 FAMOTIDINE 10 MG/ML SOLUTION

## 2025-05-30 PROCEDURE — 85025 COMPLETE CBC W/AUTO DIFF WBC: CPT | Performed by: HOSPITALIST

## 2025-05-30 PROCEDURE — 99232 SBSQ HOSP IP/OBS MODERATE 35: CPT | Performed by: HOSPITALIST

## 2025-05-30 PROCEDURE — 0DB78ZX EXCISION OF STOMACH, PYLORUS, VIA NATURAL OR ARTIFICIAL OPENING ENDOSCOPIC, DIAGNOSTIC: ICD-10-PCS | Performed by: INTERNAL MEDICINE

## 2025-05-30 PROCEDURE — 94761 N-INVAS EAR/PLS OXIMETRY MLT: CPT

## 2025-05-30 PROCEDURE — 25010000002 PROPOFOL 10 MG/ML EMULSION: Performed by: NURSE ANESTHETIST, CERTIFIED REGISTERED

## 2025-05-30 PROCEDURE — 86923 COMPATIBILITY TEST ELECTRIC: CPT

## 2025-05-30 PROCEDURE — 25010000002 LIDOCAINE PF 2% 2 % SOLUTION: Performed by: NURSE ANESTHETIST, CERTIFIED REGISTERED

## 2025-05-30 PROCEDURE — 43239 EGD BIOPSY SINGLE/MULTIPLE: CPT | Performed by: INTERNAL MEDICINE

## 2025-05-30 PROCEDURE — P9016 RBC LEUKOCYTES REDUCED: HCPCS

## 2025-05-30 PROCEDURE — 25810000003 LACTATED RINGERS PER 1000 ML: Performed by: NURSE ANESTHETIST, CERTIFIED REGISTERED

## 2025-05-30 PROCEDURE — 80048 BASIC METABOLIC PNL TOTAL CA: CPT | Performed by: HOSPITALIST

## 2025-05-30 PROCEDURE — 25010000002 PROCHLORPERAZINE 10 MG/2ML SOLUTION: Performed by: HOSPITALIST

## 2025-05-30 PROCEDURE — 25010000002 HYDROMORPHONE 1 MG/ML SOLUTION: Performed by: HOSPITALIST

## 2025-05-30 PROCEDURE — 36430 TRANSFUSION BLD/BLD COMPNT: CPT

## 2025-05-30 PROCEDURE — 25810000003 SODIUM CHLORIDE 0.9 % SOLUTION: Performed by: HOSPITALIST

## 2025-05-30 PROCEDURE — 25010000002 ONDANSETRON PER 1 MG: Performed by: HOSPITALIST

## 2025-05-30 PROCEDURE — 25010000002 ONDANSETRON PER 1 MG

## 2025-05-30 PROCEDURE — 99222 1ST HOSP IP/OBS MODERATE 55: CPT | Performed by: INTERNAL MEDICINE

## 2025-05-30 PROCEDURE — 25010000002 PHENYLEPHRINE HCL-NACL 1000-0.9 MCG/10ML-% SOLUTION PREFILLED SYRINGE: Performed by: NURSE ANESTHETIST, CERTIFIED REGISTERED

## 2025-05-30 PROCEDURE — 25010000002 MORPHINE PER 10 MG: Performed by: HOSPITALIST

## 2025-05-30 PROCEDURE — 0W3P8ZZ CONTROL BLEEDING IN GASTROINTESTINAL TRACT, VIA NATURAL OR ARTIFICIAL OPENING ENDOSCOPIC: ICD-10-PCS | Performed by: INTERNAL MEDICINE

## 2025-05-30 PROCEDURE — 25010000002 GLUCAGON (RDNA) PER 1 MG: Performed by: NURSE ANESTHETIST, CERTIFIED REGISTERED

## 2025-05-30 PROCEDURE — 88305 TISSUE EXAM BY PATHOLOGIST: CPT | Performed by: INTERNAL MEDICINE

## 2025-05-30 DEVICE — DEV CLIP ENDO RESOLUTION360 CONTRL ROT 235CM: Type: IMPLANTABLE DEVICE | Site: DUODENUM | Status: FUNCTIONAL

## 2025-05-30 RX ORDER — SODIUM CHLORIDE, SODIUM LACTATE, POTASSIUM CHLORIDE, CALCIUM CHLORIDE 600; 310; 30; 20 MG/100ML; MG/100ML; MG/100ML; MG/100ML
INJECTION, SOLUTION INTRAVENOUS CONTINUOUS PRN
Status: DISCONTINUED | OUTPATIENT
Start: 2025-05-30 | End: 2025-05-30 | Stop reason: SURG

## 2025-05-30 RX ORDER — MORPHINE SULFATE 2 MG/ML
1 INJECTION, SOLUTION INTRAMUSCULAR; INTRAVENOUS
Status: DISPENSED | OUTPATIENT
Start: 2025-05-30 | End: 2025-06-04

## 2025-05-30 RX ORDER — PROCHLORPERAZINE MALEATE 5 MG/1
5 TABLET ORAL EVERY 6 HOURS PRN
Status: DISCONTINUED | OUTPATIENT
Start: 2025-05-30 | End: 2025-06-05 | Stop reason: HOSPADM

## 2025-05-30 RX ORDER — IBUPROFEN 600 MG/1
TABLET ORAL AS NEEDED
Status: DISCONTINUED | OUTPATIENT
Start: 2025-05-30 | End: 2025-05-30 | Stop reason: SURG

## 2025-05-30 RX ORDER — PHENYLEPHRINE HCL IN 0.9% NACL 1 MG/10 ML
SYRINGE (ML) INTRAVENOUS AS NEEDED
Status: DISCONTINUED | OUTPATIENT
Start: 2025-05-30 | End: 2025-05-30 | Stop reason: SURG

## 2025-05-30 RX ORDER — PANTOPRAZOLE SODIUM 40 MG/10ML
40 INJECTION, POWDER, LYOPHILIZED, FOR SOLUTION INTRAVENOUS EVERY 12 HOURS SCHEDULED
Status: DISCONTINUED | OUTPATIENT
Start: 2025-05-30 | End: 2025-06-05 | Stop reason: HOSPADM

## 2025-05-30 RX ORDER — PROCHLORPERAZINE 25 MG
25 SUPPOSITORY, RECTAL RECTAL EVERY 12 HOURS PRN
Status: DISCONTINUED | OUTPATIENT
Start: 2025-05-30 | End: 2025-06-05 | Stop reason: HOSPADM

## 2025-05-30 RX ORDER — FAMOTIDINE 10 MG/ML
20 INJECTION, SOLUTION INTRAVENOUS ONCE
Status: COMPLETED | OUTPATIENT
Start: 2025-05-30 | End: 2025-05-30

## 2025-05-30 RX ORDER — PROCHLORPERAZINE EDISYLATE 5 MG/ML
5 INJECTION INTRAMUSCULAR; INTRAVENOUS EVERY 6 HOURS PRN
Status: DISCONTINUED | OUTPATIENT
Start: 2025-05-30 | End: 2025-06-05 | Stop reason: HOSPADM

## 2025-05-30 RX ORDER — PROPOFOL 10 MG/ML
VIAL (ML) INTRAVENOUS AS NEEDED
Status: DISCONTINUED | OUTPATIENT
Start: 2025-05-30 | End: 2025-05-30 | Stop reason: SURG

## 2025-05-30 RX ORDER — LIDOCAINE HYDROCHLORIDE 20 MG/ML
INJECTION, SOLUTION EPIDURAL; INFILTRATION; INTRACAUDAL; PERINEURAL AS NEEDED
Status: DISCONTINUED | OUTPATIENT
Start: 2025-05-30 | End: 2025-05-30 | Stop reason: SURG

## 2025-05-30 RX ORDER — ONDANSETRON 2 MG/ML
4 INJECTION INTRAMUSCULAR; INTRAVENOUS ONCE
Status: COMPLETED | OUTPATIENT
Start: 2025-05-30 | End: 2025-05-30

## 2025-05-30 RX ADMIN — LIDOCAINE HYDROCHLORIDE 60 MG: 20 INJECTION, SOLUTION INTRAVENOUS at 11:31

## 2025-05-30 RX ADMIN — PROPOFOL 20 MG: 10 INJECTION, EMULSION INTRAVENOUS at 11:41

## 2025-05-30 RX ADMIN — FAMOTIDINE 20 MG: 10 INJECTION INTRAVENOUS at 11:18

## 2025-05-30 RX ADMIN — ONDANSETRON 4 MG: 2 INJECTION INTRAMUSCULAR; INTRAVENOUS at 05:22

## 2025-05-30 RX ADMIN — Medication 10 ML: at 20:42

## 2025-05-30 RX ADMIN — GLUCAGON 0.5 MG: KIT at 11:37

## 2025-05-30 RX ADMIN — SODIUM CHLORIDE, POTASSIUM CHLORIDE, SODIUM LACTATE AND CALCIUM CHLORIDE: 600; 310; 30; 20 INJECTION, SOLUTION INTRAVENOUS at 11:18

## 2025-05-30 RX ADMIN — SODIUM CHLORIDE, SODIUM LACTATE, POTASSIUM CHLORIDE, CALCIUM CHLORIDE: 600; 310; 30; 20 INJECTION, SOLUTION INTRAVENOUS at 11:27

## 2025-05-30 RX ADMIN — OXYCODONE HYDROCHLORIDE AND ACETAMINOPHEN 1 TABLET: 5; 325 TABLET ORAL at 22:04

## 2025-05-30 RX ADMIN — OXYCODONE HYDROCHLORIDE AND ACETAMINOPHEN 1 TABLET: 5; 325 TABLET ORAL at 03:50

## 2025-05-30 RX ADMIN — HYDROMORPHONE HYDROCHLORIDE 0.5 MG: 1 INJECTION, SOLUTION INTRAMUSCULAR; INTRAVENOUS; SUBCUTANEOUS at 07:43

## 2025-05-30 RX ADMIN — MORPHINE SULFATE 1 MG: 2 INJECTION, SOLUTION INTRAMUSCULAR; INTRAVENOUS at 20:37

## 2025-05-30 RX ADMIN — HYDROMORPHONE HYDROCHLORIDE 0.5 MG: 1 INJECTION, SOLUTION INTRAMUSCULAR; INTRAVENOUS; SUBCUTANEOUS at 00:10

## 2025-05-30 RX ADMIN — SODIUM CHLORIDE 8 MG/HR: 9 INJECTION, SOLUTION INTRAVENOUS at 05:57

## 2025-05-30 RX ADMIN — ONDANSETRON 4 MG: 2 INJECTION INTRAMUSCULAR; INTRAVENOUS at 11:18

## 2025-05-30 RX ADMIN — PROPOFOL 20 MG: 10 INJECTION, EMULSION INTRAVENOUS at 11:37

## 2025-05-30 RX ADMIN — PANTOPRAZOLE SODIUM 40 MG: 40 INJECTION, POWDER, FOR SOLUTION INTRAVENOUS at 15:39

## 2025-05-30 RX ADMIN — PROPOFOL 10 MG: 10 INJECTION, EMULSION INTRAVENOUS at 11:44

## 2025-05-30 RX ADMIN — PROCHLORPERAZINE EDISYLATE 5 MG: 5 INJECTION INTRAMUSCULAR; INTRAVENOUS at 15:38

## 2025-05-30 RX ADMIN — Medication 200 MCG: at 11:36

## 2025-05-30 RX ADMIN — MORPHINE SULFATE 1 MG: 2 INJECTION, SOLUTION INTRAMUSCULAR; INTRAVENOUS at 16:34

## 2025-05-30 RX ADMIN — SODIUM CHLORIDE 1000 ML: 9 INJECTION, SOLUTION INTRAVENOUS at 15:39

## 2025-05-30 RX ADMIN — Medication 10 ML: at 09:48

## 2025-05-30 RX ADMIN — SODIUM CHLORIDE 8 MG/HR: 9 INJECTION, SOLUTION INTRAVENOUS at 01:17

## 2025-05-30 RX ADMIN — PROPOFOL 50 MG: 10 INJECTION, EMULSION INTRAVENOUS at 11:31

## 2025-05-30 RX ADMIN — DEXTROSE MONOHYDRATE 15 MMOL: 50 INJECTION, SOLUTION INTRAVENOUS at 20:41

## 2025-05-30 RX ADMIN — PANTOPRAZOLE SODIUM 40 MG: 40 INJECTION, POWDER, FOR SOLUTION INTRAVENOUS at 20:42

## 2025-05-30 RX ADMIN — PROPOFOL 10 MG: 10 INJECTION, EMULSION INTRAVENOUS at 11:46

## 2025-05-30 RX ADMIN — ONDANSETRON 4 MG: 2 INJECTION INTRAMUSCULAR; INTRAVENOUS at 13:37

## 2025-05-30 NOTE — ANESTHESIA POSTPROCEDURE EVALUATION
Patient: Dwight Gooden    Procedure Summary       Date: 05/30/25 Room / Location: [facility] ENDOSCOPY 1 / [facility] ENDOSCOPY    Anesthesia Start: 1127 Anesthesia Stop: 1156    Procedure: ESOPHAGOGASTRODUODENOSCOPY WITH APC APPLICATION, CLIP APPLICATION X1, BIOPSIES Diagnosis:       Acute blood loss anemia      (Acute blood loss anemia [D62])    Surgeons: Devi Mccarty MD Provider: Tony Alfonso CRNA    Anesthesia Type: general ASA Status: 3            Anesthesia Type: general    Vitals  Vitals Value Taken Time   /79 05/30/25 12:20   Temp 36 °C (96.8 °F) 05/30/25 11:55   Pulse 82 05/30/25 12:20   Resp 15 05/30/25 12:20   SpO2 100 % 05/30/25 12:20           Post Anesthesia Care and Evaluation    Post-procedure mental status: acceptable.  Pain management: satisfactory to patient    Airway patency: patent  Anesthetic complications: No anesthetic complications    Cardiovascular status: acceptable  Respiratory status: acceptable    Comments: Per chart review

## 2025-05-30 NOTE — PLAN OF CARE
Goal Outcome Evaluation:  Plan of Care Reviewed With: patient        Progress: improving  Outcome Evaluation: Admit for GI bleed, A/O x4, ST on tele, RA, 3 units of blood given, pt HR elevated 120s, complained of SOB, improved with blood transfusion, NPO for possible EGD, pt resting, no requests at this time

## 2025-05-30 NOTE — PLAN OF CARE
Goal Outcome Evaluation:  Plan of Care Reviewed With: spouse, patient           Outcome Evaluation: Recieved 1 unit of blood today.  EGD complete.  Morphine and norco for pain management.  Care plan ongoing

## 2025-05-30 NOTE — PROGRESS NOTES
[facility]   Hospitalist Progress Note  Date: 2025  Patient Name: Dwight Gooden  : 1945  MRN: [id number]  Date of admission: 2025  Room/Bed: ENDO/ENDO      Subjective weakness and shortness of breath  Subjective     Chief Complaint: Weakness and shortness of breath.    Summary:Patient is a 79-year-old male who presents to the ER for weakness and shortness of breath that has been going on for 2 weeks.  However the intensity of his symptoms increased today.  Patient does report some bloody stool and some abdominal pain.  Patient was recently started on diclofenac and tramadol 1 month ago.     On arrival to the ED, patient had temperature 98.1, pulse of 99, respiratory rate of 18, blood pressure of 86/63, and he saturating 100% on room air.     On labs, patient's troponin is 13, sodium is 142, chloride is 111, glucose is 109, pro time is 1.13.  Hemoglobin is 5.0.  MCV is 98.1.     Urine is bland.       Interval Followup:   EGD done- Normal esophagus. - Gastritis, characterized by erythema and granularity. Biopsied. - Normal first portion of the duodenum. 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 Treated with argon plasma coagulation (APC). Clip was placed.    Review of Systems    All systems reviewed and negative except for what is outlined above.      Objective   Objective     Vitals:   Temp:  [93.3 °F (34.1 °C)-100 °F (37.8 °C)] 96.8 °F (36 °C)  Heart Rate:  [] 84  Resp:  [10-22] 15  BP: ()/() 127/83  Flow (L/min) (Oxygen Therapy):  [2-10] 2    Physical Exam   General: Awake, alert, NAD  HENT: NCAT, MMM  Eyes: pupils equal, no scleral icterus  Cardiovascular: RRR, no murmurs   Pulmonary: CTA bilaterally; no wheezes; no conversational dyspnea  Gastrointestinal: S/ND/NT, +BS  Musculoskeletal: No gross deformities  Skin: No jaundice, no rash on exposed skin appreciated  Neuro: CN II through XII grossly intact; speech clear; no tremor  Psych: Mood and affect  appropriate  : No Aguiar catheter; no suprapubic tenderness    Result Review    Result Review:  I have personally reviewed these results:  [x]  Laboratory      Lab 05/30/25  0828 05/29/25  1625   WBC 6.90 7.02   HEMOGLOBIN 7.4* 5.0*   HEMATOCRIT 22.4* 15.7*   PLATELETS 123* 165   NEUTROS ABS 4.22 5.08   IMMATURE GRANS (ABS) 0.04 0.04   LYMPHS ABS 1.73 1.35   MONOS ABS 0.65 0.48   EOS ABS 0.22 0.06   MCV 90.7 98.1*   PROTIME  --  15.0*         Lab 05/30/25  0828 05/29/25  1625   SODIUM 141 142   POTASSIUM 3.7 3.6   CHLORIDE 116* 111*   CO2 16.9* 20.2*   ANION GAP 8.1 10.8   BUN 29.9* 35.5*   CREATININE 1.13 1.21   EGFR 66.9 61.7   GLUCOSE 145* 109*   CALCIUM 7.4* 8.8   MAGNESIUM  --  2.0   PHOSPHORUS 2.2* 2.3*         Lab 05/29/25  1625   TOTAL PROTEIN 5.3*   ALBUMIN 3.6   GLOBULIN 1.7   ALT (SGPT) 11   AST (SGOT) 16   BILIRUBIN 0.4   ALK PHOS 41         Lab 05/29/25  1753 05/29/25  1625   HSTROP T 12 13   PROTIME  --  15.0*   INR  --  1.13             Lab 05/29/25  1708   ABO TYPING O   RH TYPING Positive   ANTIBODY SCREEN Negative         Brief Urine Lab Results  (Last result in the past 365 days)        Color   Clarity   Blood   Leuk Est   Nitrite   Protein   CREAT   Urine HCG        05/29/25 1602 Yellow   Clear   Negative   Negative   Negative   Negative                 [x]  Microbiology   Microbiology Results (last 10 days)       ** No results found for the last 240 hours. **          [x]  Radiology  XR Chest 1 View  Result Date: 5/29/2025  Impression: No radiographic evidence of acute pulmonary process Electronically Signed: Cuong Amaral MD  5/29/2025 3:05 PM EDT  Workstation ID: [id number]    [x]  EKG/Telemetry   [x]  Cardiology/Vascular   [x]  Pathology  [x]  Old records  []  Other:    Assessment & Plan   Assessment / Plan   #1 GI bleed  -EGD shows- Normal esophagus. - Gastritis, characterized by erythema and granularity. Biopsied. - Normal first portion of the duodenum. - Multiple non-bleeding  angioectasias in the duodenum. Treated with argon plasma coagulation (APC). Clip was placed.  -Continue Protonix.  -Patient transfused 3 units of blood during this admission.  Transfuse if less than 7.  -Possible colonoscopy on Monday.     Discussed with RN.    VTE Prophylaxis:  Mechanical VTE prophylaxis orders are present.        CODE STATUS:   Code Status (Patient has no pulse and is not breathing): CPR (Attempt to Resuscitate)  Medical Interventions (Patient has pulse or is breathing): Full Support  Level Of Support Discussed With: Patient      Electronically signed by García Odell DO, 5/30/2025, 14:03 EDT.

## 2025-05-30 NOTE — ANESTHESIA PREPROCEDURE EVALUATION
Anesthesia Evaluation     Patient summary reviewed and Nursing notes reviewed   NPO Solid Status: > 8 hours  NPO Liquid Status: > 8 hours           Airway   Mallampati: I  TM distance: >3 FB  Neck ROM: full  No difficulty expected  Dental - normal exam     Pulmonary - normal exam    breath sounds clear to auscultation  (+) a smoker (quit 50 yrs ago) Former,  Cardiovascular - normal exam  Exercise tolerance: good (4-7 METS)    ECG reviewed  Rhythm: regular  Rate: normal    (+) hyperlipidemia      Neuro/Psych  GI/Hepatic/Renal/Endo    (+) GI bleeding active bleeding    Musculoskeletal     Abdominal    Substance History      OB/GYN          Other      history of cancer    ROS/Med Hx Other: Acute blood loss, anemia    Labs:   05/29/25 16:25  Protime: 15.0 (H)  INR: 1.13  WBC: 7.02  RBC: 1.60 (L)  Hemoglobin: 5.0 (C)  Hematocrit: 15.7 (C)  Platelets: 165  RDW: 14.6  MCV: 98.1 (H)  MCH: 31.3    05/30/25 08:28  WBC: 6.90  RBC: 2.47 (L)  Hemoglobin: 7.4 (L)  Hematocrit: 22.4 (L)  Platelets: 123 (L)  RDW: 15.2  MCV: 90.7  MCH: 30.0      Received 3 units PRBC's on 05/29     EKG 05/29/25: ,   Sinus tachycardia  Atrial premature complex  Borderline  low voltage, extremity leads  Nonspecific  T abnormalities, lateral leads  No previous ECG available for comparison        Phys Exam Other: Zofran and pepcid in preop for nausea     Protonix and octreotide gtt infusing               Anesthesia Plan    ASA 3     general   total IV anesthesia  (Total IV Anesthesia    Patient understands anesthesia not responsible for dental damage.      Discussed risks with pt including aspiration, allergic reactions, apnea, advanced airway placement. Pt verbalized understanding. All questions answered.   )  intravenous induction     Anesthetic plan, risks, benefits, and alternatives have been provided, discussed and informed consent has been obtained with: patient and child.  Pre-procedure education provided  Plan discussed with  CRNA.      CODE STATUS:    Code Status (Patient has no pulse and is not breathing): CPR (Attempt to Resuscitate)  Medical Interventions (Patient has pulse or is breathing): Full Support  Level Of Support Discussed With: Patient
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
## 2025-05-31 LAB
ANION GAP SERPL CALCULATED.3IONS-SCNC: 7.6 MMOL/L (ref 5–15)
BASOPHILS # BLD AUTO: 0.01 10*3/MM3 (ref 0–0.2)
BASOPHILS NFR BLD AUTO: 0.1 % (ref 0–1.5)
BH BB BLOOD EXPIRATION DATE: NORMAL
BH BB BLOOD TYPE BARCODE: 5100
BH BB DISPENSE STATUS: NORMAL
BH BB PRODUCT CODE: NORMAL
BH BB UNIT NUMBER: NORMAL
BUN SERPL-MCNC: 21.6 MG/DL (ref 8–23)
BUN/CREAT SERPL: 18.8 (ref 7–25)
CALCIUM SPEC-SCNC: 7 MG/DL (ref 8.6–10.5)
CHLORIDE SERPL-SCNC: 113 MMOL/L (ref 98–107)
CO2 SERPL-SCNC: 18.4 MMOL/L (ref 22–29)
CREAT SERPL-MCNC: 1.15 MG/DL (ref 0.76–1.27)
CROSSMATCH INTERPRETATION: NORMAL
DEPRECATED RDW RBC AUTO: 50.2 FL (ref 37–54)
EGFRCR SERPLBLD CKD-EPI 2021: 65.5 ML/MIN/1.73
EOSINOPHIL # BLD AUTO: 0.28 10*3/MM3 (ref 0–0.4)
EOSINOPHIL NFR BLD AUTO: 3.5 % (ref 0.3–6.2)
ERYTHROCYTE [DISTWIDTH] IN BLOOD BY AUTOMATED COUNT: 15.2 % (ref 12.3–15.4)
GLUCOSE SERPL-MCNC: 133 MG/DL (ref 65–99)
HCT VFR BLD AUTO: 18.6 % (ref 37.5–51)
HCT VFR BLD AUTO: 22.9 % (ref 37.5–51)
HGB BLD-MCNC: 6.2 G/DL (ref 13–17.7)
HGB BLD-MCNC: 7.7 G/DL (ref 13–17.7)
IMM GRANULOCYTES # BLD AUTO: 0.03 10*3/MM3 (ref 0–0.05)
IMM GRANULOCYTES NFR BLD AUTO: 0.4 % (ref 0–0.5)
LYMPHOCYTES # BLD AUTO: 1.53 10*3/MM3 (ref 0.7–3.1)
LYMPHOCYTES NFR BLD AUTO: 19 % (ref 19.6–45.3)
MCH RBC QN AUTO: 30.4 PG (ref 26.6–33)
MCHC RBC AUTO-ENTMCNC: 33.3 G/DL (ref 31.5–35.7)
MCV RBC AUTO: 91.2 FL (ref 79–97)
MONOCYTES # BLD AUTO: 0.73 10*3/MM3 (ref 0.1–0.9)
MONOCYTES NFR BLD AUTO: 9.1 % (ref 5–12)
NEUTROPHILS NFR BLD AUTO: 5.46 10*3/MM3 (ref 1.7–7)
NEUTROPHILS NFR BLD AUTO: 67.9 % (ref 42.7–76)
NRBC BLD AUTO-RTO: 0.2 /100 WBC (ref 0–0.2)
PHOSPHATE SERPL-MCNC: 3.1 MG/DL (ref 2.5–4.5)
PLATELET # BLD AUTO: 115 10*3/MM3 (ref 140–450)
PMV BLD AUTO: 9.8 FL (ref 6–12)
POTASSIUM SERPL-SCNC: 3.1 MMOL/L (ref 3.5–5.2)
RBC # BLD AUTO: 2.04 10*6/MM3 (ref 4.14–5.8)
SODIUM SERPL-SCNC: 139 MMOL/L (ref 136–145)
UNIT  ABO: NORMAL
UNIT  RH: NORMAL
WBC NRBC COR # BLD AUTO: 8.04 10*3/MM3 (ref 3.4–10.8)

## 2025-05-31 PROCEDURE — 36430 TRANSFUSION BLD/BLD COMPNT: CPT

## 2025-05-31 PROCEDURE — 80048 BASIC METABOLIC PNL TOTAL CA: CPT | Performed by: HOSPITALIST

## 2025-05-31 PROCEDURE — 25010000002 FUROSEMIDE PER 20 MG: Performed by: HOSPITALIST

## 2025-05-31 PROCEDURE — 99232 SBSQ HOSP IP/OBS MODERATE 35: CPT | Performed by: HOSPITALIST

## 2025-05-31 PROCEDURE — P9016 RBC LEUKOCYTES REDUCED: HCPCS

## 2025-05-31 PROCEDURE — 85025 COMPLETE CBC W/AUTO DIFF WBC: CPT | Performed by: HOSPITALIST

## 2025-05-31 PROCEDURE — 85014 HEMATOCRIT: CPT | Performed by: HOSPITALIST

## 2025-05-31 PROCEDURE — 86923 COMPATIBILITY TEST ELECTRIC: CPT

## 2025-05-31 PROCEDURE — 86900 BLOOD TYPING SEROLOGIC ABO: CPT

## 2025-05-31 PROCEDURE — 25010000002 MORPHINE PER 10 MG: Performed by: HOSPITALIST

## 2025-05-31 PROCEDURE — 25010000002 PROCHLORPERAZINE 10 MG/2ML SOLUTION: Performed by: HOSPITALIST

## 2025-05-31 PROCEDURE — 84100 ASSAY OF PHOSPHORUS: CPT | Performed by: HOSPITALIST

## 2025-05-31 PROCEDURE — 63710000001 PREDNISONE PER 5 MG: Performed by: HOSPITALIST

## 2025-05-31 PROCEDURE — 85018 HEMOGLOBIN: CPT | Performed by: HOSPITALIST

## 2025-05-31 RX ORDER — PREDNISONE 10 MG/1
5 TABLET ORAL NIGHTLY
Status: DISCONTINUED | OUTPATIENT
Start: 2025-05-31 | End: 2025-06-02

## 2025-05-31 RX ORDER — POTASSIUM CHLORIDE 750 MG/1
40 CAPSULE, EXTENDED RELEASE ORAL 2 TIMES DAILY WITH MEALS
Status: COMPLETED | OUTPATIENT
Start: 2025-05-31 | End: 2025-05-31

## 2025-05-31 RX ORDER — FUROSEMIDE 10 MG/ML
40 INJECTION INTRAMUSCULAR; INTRAVENOUS ONCE
Status: DISCONTINUED | OUTPATIENT
Start: 2025-05-31 | End: 2025-05-31

## 2025-05-31 RX ORDER — FUROSEMIDE 10 MG/ML
20 INJECTION INTRAMUSCULAR; INTRAVENOUS ONCE
Status: DISCONTINUED | OUTPATIENT
Start: 2025-05-31 | End: 2025-05-31

## 2025-05-31 RX ORDER — ABIRATERONE ACETATE 250 MG/1
1000 TABLET ORAL NIGHTLY
Status: DISCONTINUED | OUTPATIENT
Start: 2025-05-31 | End: 2025-06-05 | Stop reason: HOSPADM

## 2025-05-31 RX ORDER — GABAPENTIN 300 MG/1
600 CAPSULE ORAL EVERY 8 HOURS SCHEDULED
Status: DISCONTINUED | OUTPATIENT
Start: 2025-05-31 | End: 2025-06-05 | Stop reason: HOSPADM

## 2025-05-31 RX ORDER — FUROSEMIDE 10 MG/ML
20 INJECTION INTRAMUSCULAR; INTRAVENOUS EVERY 12 HOURS
Status: COMPLETED | OUTPATIENT
Start: 2025-05-31 | End: 2025-05-31

## 2025-05-31 RX ORDER — BISACODYL 5 MG/1
20 TABLET, DELAYED RELEASE ORAL ONCE
Status: COMPLETED | OUTPATIENT
Start: 2025-05-31 | End: 2025-05-31

## 2025-05-31 RX ORDER — ATORVASTATIN CALCIUM 40 MG/1
80 TABLET, FILM COATED ORAL NIGHTLY
Status: DISCONTINUED | OUTPATIENT
Start: 2025-05-31 | End: 2025-06-05 | Stop reason: HOSPADM

## 2025-05-31 RX ADMIN — MORPHINE SULFATE 1 MG: 2 INJECTION, SOLUTION INTRAMUSCULAR; INTRAVENOUS at 00:31

## 2025-05-31 RX ADMIN — PANTOPRAZOLE SODIUM 40 MG: 40 INJECTION, POWDER, FOR SOLUTION INTRAVENOUS at 09:31

## 2025-05-31 RX ADMIN — FUROSEMIDE 20 MG: 10 INJECTION, SOLUTION INTRAMUSCULAR; INTRAVENOUS at 23:43

## 2025-05-31 RX ADMIN — FUROSEMIDE 20 MG: 10 INJECTION, SOLUTION INTRAMUSCULAR; INTRAVENOUS at 10:50

## 2025-05-31 RX ADMIN — BISACODYL 20 MG: 5 TABLET, COATED ORAL at 21:31

## 2025-05-31 RX ADMIN — GABAPENTIN 600 MG: 300 CAPSULE ORAL at 15:04

## 2025-05-31 RX ADMIN — MORPHINE SULFATE 1 MG: 2 INJECTION, SOLUTION INTRAMUSCULAR; INTRAVENOUS at 21:45

## 2025-05-31 RX ADMIN — MORPHINE SULFATE 1 MG: 2 INJECTION, SOLUTION INTRAMUSCULAR; INTRAVENOUS at 16:06

## 2025-05-31 RX ADMIN — POTASSIUM CHLORIDE 40 MEQ: 750 CAPSULE, EXTENDED RELEASE ORAL at 10:51

## 2025-05-31 RX ADMIN — GABAPENTIN 600 MG: 300 CAPSULE ORAL at 21:31

## 2025-05-31 RX ADMIN — ATORVASTATIN CALCIUM 80 MG: 40 TABLET, FILM COATED ORAL at 21:32

## 2025-05-31 RX ADMIN — Medication 10 ML: at 09:31

## 2025-05-31 RX ADMIN — PROCHLORPERAZINE EDISYLATE 5 MG: 5 INJECTION INTRAMUSCULAR; INTRAVENOUS at 01:13

## 2025-05-31 RX ADMIN — PANTOPRAZOLE SODIUM 40 MG: 40 INJECTION, POWDER, FOR SOLUTION INTRAVENOUS at 21:32

## 2025-05-31 RX ADMIN — POLYETHYLENE GLYCOL 3350, SODIUM SULFATE ANHYDROUS, SODIUM BICARBONATE, SODIUM CHLORIDE, POTASSIUM CHLORIDE 4000 ML: 236; 22.74; 6.74; 5.86; 2.97 POWDER, FOR SOLUTION ORAL at 21:32

## 2025-05-31 RX ADMIN — PREDNISONE 5 MG: 10 TABLET ORAL at 21:31

## 2025-05-31 RX ADMIN — POTASSIUM CHLORIDE 40 MEQ: 750 CAPSULE, EXTENDED RELEASE ORAL at 17:34

## 2025-05-31 RX ADMIN — MORPHINE SULFATE 1 MG: 2 INJECTION, SOLUTION INTRAMUSCULAR; INTRAVENOUS at 07:23

## 2025-05-31 NOTE — SIGNIFICANT NOTE
05/31/25 1058   Physical Therapy Time and Intention   Session Not Performed patient unavailable for evaluation  [facility]

## 2025-05-31 NOTE — PROGRESS NOTES
[facility]   Hospitalist Progress Note  Date: 2025  Patient Name: Dwight Gooden  : 1945  MRN: [id number]  Date of admission: 2025  Room/Bed: 256/1      Subjective weakness and shortness of breath  Subjective     Chief Complaint: Weakness and shortness of breath.    Summary:Patient is a 79-year-old male who presents to the ER for weakness and shortness of breath that has been going on for 2 weeks.  However the intensity of his symptoms increased today.  Patient does report some bloody stool and some abdominal pain.  Patient was recently started on diclofenac and tramadol 1 month ago.     On arrival to the ED, patient had temperature 98.1, pulse of 99, respiratory rate of 18, blood pressure of 86/63, and he saturating 100% on room air.     On labs, patient's troponin is 13, sodium is 142, chloride is 111, glucose is 109, pro time is 1.13.  Hemoglobin is 5.0.  MCV is 98.1.     Urine is bland.       Interval Followup:   Patient hemoglobin dropped again.  Patient had some issues with pain and BP.  He has received several units of blood.      Review of Systems    All systems reviewed and negative except for what is outlined above.      Objective   Objective     Vitals:   Temp:  [96.7 °F (35.9 °C)-99.9 °F (37.7 °C)] 98.8 °F (37.1 °C)  Heart Rate:  [] 109  Resp:  [12-20] 16  BP: (103-136)/() 107/70  Flow (L/min) (Oxygen Therapy):  [2-10] 2    Physical Exam   General: Awake, alert, NAD  HENT: NCAT, MMM  Eyes: pupils equal, no scleral icterus  Cardiovascular: RRR, no murmurs   Pulmonary: CTA bilaterally; no wheezes; no conversational dyspnea  Gastrointestinal: S/ND/NT, +BS  Musculoskeletal: hands swollen.    Skin: No jaundice, no rash on exposed skin appreciated  Neuro: CN II through XII grossly intact; speech clear; no tremor  Psych: Mood and affect appropriate  : No Aguiar catheter; no suprapubic tenderness    Result Review    Result Review:  I have personally reviewed these  results:  [x]  Laboratory      Lab 05/31/25  0536 05/30/25  0828 05/29/25  1625   WBC 8.04 6.90 7.02   HEMOGLOBIN 6.2* 7.4* 5.0*   HEMATOCRIT 18.6* 22.4* 15.7*   PLATELETS 115* 123* 165   NEUTROS ABS 5.46 4.22 5.08   IMMATURE GRANS (ABS) 0.03 0.04 0.04   LYMPHS ABS 1.53 1.73 1.35   MONOS ABS 0.73 0.65 0.48   EOS ABS 0.28 0.22 0.06   MCV 91.2 90.7 98.1*   PROTIME  --   --  15.0*         Lab 05/31/25  0536 05/30/25  0828 05/29/25  1625   SODIUM 139 141 142   POTASSIUM 3.1* 3.7 3.6   CHLORIDE 113* 116* 111*   CO2 18.4* 16.9* 20.2*   ANION GAP 7.6 8.1 10.8   BUN 21.6 29.9* 35.5*   CREATININE 1.15 1.13 1.21   EGFR 65.5 66.9 61.7   GLUCOSE 133* 145* 109*   CALCIUM 7.0* 7.4* 8.8   MAGNESIUM  --   --  2.0   PHOSPHORUS 3.1 2.2* 2.3*         Lab 05/29/25  1625   TOTAL PROTEIN 5.3*   ALBUMIN 3.6   GLOBULIN 1.7   ALT (SGPT) 11   AST (SGOT) 16   BILIRUBIN 0.4   ALK PHOS 41         Lab 05/29/25  1753 05/29/25  1625   HSTROP T 12 13   PROTIME  --  15.0*   INR  --  1.13             Lab 05/29/25  1708   ABO TYPING O   RH TYPING Positive   ANTIBODY SCREEN Negative         Brief Urine Lab Results  (Last result in the past 365 days)        Color   Clarity   Blood   Leuk Est   Nitrite   Protein   CREAT   Urine HCG        05/29/25 1602 Yellow   Clear   Negative   Negative   Negative   Negative                 [x]  Microbiology   Microbiology Results (last 10 days)       ** No results found for the last 240 hours. **          [x]  Radiology  XR Chest 1 View  Result Date: 5/29/2025  Impression: No radiographic evidence of acute pulmonary process Electronically Signed: Cuong Amaral MD  5/29/2025 3:05 PM EDT  Workstation ID: [id number]    [x]  EKG/Telemetry   [x]  Cardiology/Vascular   [x]  Pathology  [x]  Old records  []  Other:    Assessment & Plan   Assessment / Plan   #1 GI bleed  -EGD shows- Normal esophagus. - Gastritis, characterized by erythema and granularity. Biopsied. - Normal first portion of the duodenum. 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 Treated with argon plasma coagulation (APC). Clip was placed.  -Continue Protonix.  -Patient transfused multiple units of blood.  Will give 20 IV lasix X 2 .    -colonoscopy on Monday.    #2 Low potassium-replete    #3 Hx of prostrate cancer  -patient will need to bring home medication    #4 Neuropathy-resume home gabapentin.      #5 HTN-holding nifedipine because of hypotension.      Discussed with RN.    VTE Prophylaxis:  Mechanical VTE prophylaxis orders are present.        CODE STATUS:   Code Status (Patient has no pulse and is not breathing): CPR (Attempt to Resuscitate)  Medical Interventions (Patient has pulse or is breathing): Full Support  Level Of Support Discussed With: Patient      Electronically signed by García Odell DO, 5/31/2025, 10:14 EDT.

## 2025-05-31 NOTE — PLAN OF CARE
Goal Outcome Evaluation:  Plan of Care Reviewed With: patient        Progress: improving  Outcome Evaluation: Patient alert and oriented x4, AM Hgb 6.2 so 1 unit PRBC administered, H&H lab at 1515 improved Hgb 7.7 - IV morphine 1mg administered 2x during shift for severe pain and pain was more controlled, no complaints of nausea. LUE IV removed, monitor tenderness/swelling. Patient safety ensured, call light in reach.

## 2025-05-31 NOTE — PLAN OF CARE
Goal Outcome Evaluation:  Plan of Care Reviewed With: patient        Progress: no change  Outcome Evaluation: Patient struggled with pain control this shift. Patient still has bloody stools. One episode of severe nausea this shift, compazine given, provided relief. No other concerns at this time.

## 2025-06-01 ENCOUNTER — ANESTHESIA (OUTPATIENT)
Dept: GASTROENTEROLOGY | Facility: HOSPITAL | Age: 80
End: 2025-06-01
Payer: MEDICARE

## 2025-06-01 ENCOUNTER — ANESTHESIA EVENT (OUTPATIENT)
Dept: GASTROENTEROLOGY | Facility: HOSPITAL | Age: 80
End: 2025-06-01
Payer: MEDICARE

## 2025-06-01 LAB
ANION GAP SERPL CALCULATED.3IONS-SCNC: 11.3 MMOL/L (ref 5–15)
BASOPHILS # BLD AUTO: 0.03 10*3/MM3 (ref 0–0.2)
BASOPHILS NFR BLD AUTO: 0.4 % (ref 0–1.5)
BUN SERPL-MCNC: 18.2 MG/DL (ref 8–23)
BUN/CREAT SERPL: 17 (ref 7–25)
CALCIUM SPEC-SCNC: 7.8 MG/DL (ref 8.6–10.5)
CHLORIDE SERPL-SCNC: 111 MMOL/L (ref 98–107)
CO2 SERPL-SCNC: 17.7 MMOL/L (ref 22–29)
CREAT SERPL-MCNC: 1.07 MG/DL (ref 0.76–1.27)
DEPRECATED RDW RBC AUTO: 50.4 FL (ref 37–54)
EGFRCR SERPLBLD CKD-EPI 2021: 71.5 ML/MIN/1.73
EOSINOPHIL # BLD AUTO: 0.09 10*3/MM3 (ref 0–0.4)
EOSINOPHIL NFR BLD AUTO: 1.2 % (ref 0.3–6.2)
ERYTHROCYTE [DISTWIDTH] IN BLOOD BY AUTOMATED COUNT: 15.5 % (ref 12.3–15.4)
GLUCOSE SERPL-MCNC: 96 MG/DL (ref 65–99)
HCT VFR BLD AUTO: 24.2 % (ref 37.5–51)
HCT VFR BLD AUTO: 25.6 % (ref 37.5–51)
HCT VFR BLD AUTO: 26.1 % (ref 37.5–51)
HCT VFR BLD AUTO: 26.8 % (ref 37.5–51)
HGB BLD-MCNC: 7.8 G/DL (ref 13–17.7)
HGB BLD-MCNC: 8.5 G/DL (ref 13–17.7)
HGB BLD-MCNC: 8.6 G/DL (ref 13–17.7)
HGB BLD-MCNC: 8.6 G/DL (ref 13–17.7)
IMM GRANULOCYTES # BLD AUTO: 0.02 10*3/MM3 (ref 0–0.05)
IMM GRANULOCYTES NFR BLD AUTO: 0.3 % (ref 0–0.5)
LYMPHOCYTES # BLD AUTO: 1.13 10*3/MM3 (ref 0.7–3.1)
LYMPHOCYTES NFR BLD AUTO: 14.5 % (ref 19.6–45.3)
MCH RBC QN AUTO: 28.8 PG (ref 26.6–33)
MCHC RBC AUTO-ENTMCNC: 32.1 G/DL (ref 31.5–35.7)
MCV RBC AUTO: 89.6 FL (ref 79–97)
MONOCYTES # BLD AUTO: 0.61 10*3/MM3 (ref 0.1–0.9)
MONOCYTES NFR BLD AUTO: 7.8 % (ref 5–12)
NEUTROPHILS NFR BLD AUTO: 5.91 10*3/MM3 (ref 1.7–7)
NEUTROPHILS NFR BLD AUTO: 75.8 % (ref 42.7–76)
NRBC BLD AUTO-RTO: 0.4 /100 WBC (ref 0–0.2)
PLATELET # BLD AUTO: 136 10*3/MM3 (ref 140–450)
PMV BLD AUTO: 9.6 FL (ref 6–12)
POTASSIUM SERPL-SCNC: 3.8 MMOL/L (ref 3.5–5.2)
RBC # BLD AUTO: 2.99 10*6/MM3 (ref 4.14–5.8)
SODIUM SERPL-SCNC: 140 MMOL/L (ref 136–145)
WBC NRBC COR # BLD AUTO: 7.79 10*3/MM3 (ref 3.4–10.8)

## 2025-06-01 PROCEDURE — 63710000001 PREDNISONE PER 5 MG: Performed by: INTERNAL MEDICINE

## 2025-06-01 PROCEDURE — 25810000003 LACTATED RINGERS PER 1000 ML: Performed by: ANESTHESIOLOGY

## 2025-06-01 PROCEDURE — 85025 COMPLETE CBC W/AUTO DIFF WBC: CPT | Performed by: HOSPITALIST

## 2025-06-01 PROCEDURE — 85014 HEMATOCRIT: CPT | Performed by: INTERNAL MEDICINE

## 2025-06-01 PROCEDURE — 80048 BASIC METABOLIC PNL TOTAL CA: CPT | Performed by: HOSPITALIST

## 2025-06-01 PROCEDURE — 45378 DIAGNOSTIC COLONOSCOPY: CPT | Performed by: INTERNAL MEDICINE

## 2025-06-01 PROCEDURE — 25010000002 PROPOFOL 10 MG/ML EMULSION: Performed by: ANESTHESIOLOGY

## 2025-06-01 PROCEDURE — 0DJD8ZZ INSPECTION OF LOWER INTESTINAL TRACT, VIA NATURAL OR ARTIFICIAL OPENING ENDOSCOPIC: ICD-10-PCS | Performed by: INTERNAL MEDICINE

## 2025-06-01 PROCEDURE — 85018 HEMOGLOBIN: CPT | Performed by: INTERNAL MEDICINE

## 2025-06-01 PROCEDURE — 25010000002 ESMOLOL 100 MG/10ML SOLUTION: Performed by: ANESTHESIOLOGY

## 2025-06-01 PROCEDURE — 25010000002 MORPHINE PER 10 MG: Performed by: INTERNAL MEDICINE

## 2025-06-01 PROCEDURE — 99232 SBSQ HOSP IP/OBS MODERATE 35: CPT | Performed by: INTERNAL MEDICINE

## 2025-06-01 RX ORDER — SODIUM CHLORIDE, SODIUM LACTATE, POTASSIUM CHLORIDE, CALCIUM CHLORIDE 600; 310; 30; 20 MG/100ML; MG/100ML; MG/100ML; MG/100ML
INJECTION, SOLUTION INTRAVENOUS CONTINUOUS PRN
Status: DISCONTINUED | OUTPATIENT
Start: 2025-06-01 | End: 2025-06-01 | Stop reason: SURG

## 2025-06-01 RX ORDER — ESMOLOL HYDROCHLORIDE 10 MG/ML
INJECTION INTRAVENOUS AS NEEDED
Status: DISCONTINUED | OUTPATIENT
Start: 2025-06-01 | End: 2025-06-01 | Stop reason: SURG

## 2025-06-01 RX ORDER — PROPOFOL 10 MG/ML
VIAL (ML) INTRAVENOUS AS NEEDED
Status: DISCONTINUED | OUTPATIENT
Start: 2025-06-01 | End: 2025-06-01 | Stop reason: SURG

## 2025-06-01 RX ADMIN — GABAPENTIN 600 MG: 300 CAPSULE ORAL at 14:47

## 2025-06-01 RX ADMIN — PROPOFOL 40 MG: 10 INJECTION, EMULSION INTRAVENOUS at 07:25

## 2025-06-01 RX ADMIN — ESMOLOL HYDROCHLORIDE 10 MG: 10 INJECTION, SOLUTION INTRAVENOUS at 07:50

## 2025-06-01 RX ADMIN — PROPOFOL 20 MG: 10 INJECTION, EMULSION INTRAVENOUS at 07:45

## 2025-06-01 RX ADMIN — Medication 10 ML: at 08:41

## 2025-06-01 RX ADMIN — PROPOFOL 20 MG: 10 INJECTION, EMULSION INTRAVENOUS at 07:40

## 2025-06-01 RX ADMIN — MORPHINE SULFATE 1 MG: 2 INJECTION, SOLUTION INTRAMUSCULAR; INTRAVENOUS at 21:02

## 2025-06-01 RX ADMIN — ESMOLOL HYDROCHLORIDE 20 MG: 10 INJECTION, SOLUTION INTRAVENOUS at 07:57

## 2025-06-01 RX ADMIN — ATORVASTATIN CALCIUM 80 MG: 40 TABLET, FILM COATED ORAL at 21:01

## 2025-06-01 RX ADMIN — PROPOFOL 20 MG: 10 INJECTION, EMULSION INTRAVENOUS at 07:33

## 2025-06-01 RX ADMIN — Medication 10 ML: at 00:38

## 2025-06-01 RX ADMIN — PROPOFOL 20 MG: 10 INJECTION, EMULSION INTRAVENOUS at 08:01

## 2025-06-01 RX ADMIN — MORPHINE SULFATE 1 MG: 2 INJECTION, SOLUTION INTRAMUSCULAR; INTRAVENOUS at 09:23

## 2025-06-01 RX ADMIN — PROPOFOL 20 MG: 10 INJECTION, EMULSION INTRAVENOUS at 07:49

## 2025-06-01 RX ADMIN — Medication 10 ML: at 21:02

## 2025-06-01 RX ADMIN — GABAPENTIN 600 MG: 300 CAPSULE ORAL at 21:01

## 2025-06-01 RX ADMIN — ESMOLOL HYDROCHLORIDE 10 MG: 10 INJECTION, SOLUTION INTRAVENOUS at 07:39

## 2025-06-01 RX ADMIN — PROPOFOL 20 MG: 10 INJECTION, EMULSION INTRAVENOUS at 07:53

## 2025-06-01 RX ADMIN — PROPOFOL 20 MG: 10 INJECTION, EMULSION INTRAVENOUS at 07:27

## 2025-06-01 RX ADMIN — PREDNISONE 5 MG: 10 TABLET ORAL at 21:01

## 2025-06-01 RX ADMIN — PANTOPRAZOLE SODIUM 40 MG: 40 INJECTION, POWDER, FOR SOLUTION INTRAVENOUS at 21:02

## 2025-06-01 RX ADMIN — PROPOFOL 20 MG: 10 INJECTION, EMULSION INTRAVENOUS at 07:37

## 2025-06-01 RX ADMIN — PANTOPRAZOLE SODIUM 40 MG: 40 INJECTION, POWDER, FOR SOLUTION INTRAVENOUS at 08:41

## 2025-06-01 RX ADMIN — SODIUM CHLORIDE, POTASSIUM CHLORIDE, SODIUM LACTATE AND CALCIUM CHLORIDE: 600; 310; 30; 20 INJECTION, SOLUTION INTRAVENOUS at 07:14

## 2025-06-01 NOTE — PROGRESS NOTES
[facility]       Patient Name: Dwight Gooden  : 1945  MRN: [id number]  Primary Care Physician: Elías Quintana MD  Date of admission: 2025  Today's Date: 2025  Room / Bed:   [facility]/1  Subjective   Chief Complaint: Weakness    Summary:  Patient is a 79-year-old male who presents to the ER for weakness and shortness of breath that has been going on for 2 weeks.  However the intensity of his symptoms increased today.  Patient does report some bloody stool and some abdominal pain.  Patient was recently started on diclofenac and tramadol 1 month ago.  On arrival to the ED, patient had temperature 98.1, pulse of 99, respiratory rate of 18, blood pressure of 86/63, and he saturating 100% on room air.     On labs, patient's troponin is 13, sodium is 142, chloride is 111, glucose is 109, pro time is 1.13.  Hemoglobin is 5.0.  MCV is 98.1.       Interval Followup: 2025    Very pleasant gentleman.  Wife at bedside.  Hemoglobin up to 8.6.  Feeling much better.  Had colonoscopy earlier today.  EGD 2 days ago.  Hungry.  Diet being advanced this morning.      REVIEW OF SYSTEMS:   Weakness  Objective   Temp:  [97.1 °F (36.2 °C)-99.5 °F (37.5 °C)] 99.3 °F (37.4 °C)  Heart Rate:  [] 88  Resp:  [13-20] 16  BP: ()/(63-82) 118/76  Flow (L/min) (Oxygen Therapy):  [2] 2  PHYSICAL EXAM   CON: WN. WD. NAD.   NECK:  No stridor.   RESP:  No wheezes. No crackles.  No work of breathing.   CV:  RRR. No murmur noted.  No edema.  GI:  Soft and nontender.    EXT: No cyanosis or clubbing.  PSYCH:  Alert. Oriented. Calm mood.  NEURO:  No dysarthria or aphasia.   SKIN: No chronic venous stasis changes or varicosities.         Results from last 7 days   Lab Units 25  0844 25  0512 25  1515 25  0536 25  0828 25  1625   WBC 10*3/mm3  --  7.79  --  8.04 6.90 7.02   HEMOGLOBIN g/dL 8.5* 8.6* 7.7* 6.2* 7.4* 5.0*   HEMATOCRIT % 25.6* 26.8* 22.9* 18.6* 22.4*  15.7*   PLATELETS 10*3/mm3  --  136*  --  115* 123* 165     Results from last 7 days   Lab Units 06/01/25  0512 05/31/25  0536 05/30/25  0828 05/29/25  1625   SODIUM mmol/L 140 139 141 142   POTASSIUM mmol/L 3.8 3.1* 3.7 3.6   CO2 mmol/L 17.7* 18.4* 16.9* 20.2*   CHLORIDE mmol/L 111* 113* 116* 111*   ANION GAP mmol/L 11.3 7.6 8.1 10.8   BUN mg/dL 18.2 21.6 29.9* 35.5*   CREATININE mg/dL 1.07 1.15 1.13 1.21   GLUCOSE mg/dL 96 133* 145* 109*     Results from last 7 days   Lab Units 05/29/25  1625   INR  1.13     Assessment / Plan   Assessment:    Anemia  GI bleeding  Hypokalemia  Hx prostate cancer  Hx neuropathy  Hx HTN with recent hypotensive readings  Status post EGD 5/30, with argon plasma coagulation of nonbleeding duodenal angioectasia  Status post colonoscopy 6/1, poor prep with findings of resolved diverticular bleed     Plan:    Monitored bed   appreciate gastroenterology consult/evaluation  Status post EGD and colonoscopy  Twice daily PPI  Advance diet  Monitor serial hemoglobin  Avoid NSAIDs/AC/antiplatelets  Replace electrolytes/potassium as needed  Continue home medications, but holding antihypertensives in the setting of soft BP    Discussed plan with RN.  VTE Prophylaxis:  Mechanical VTE prophylaxis orders are present.      CODE STATUS:      Code Status (Patient has no pulse and is not breathing): CPR (Attempt to Resuscitate)  Medical Interventions (Patient has pulse or is breathing): Full Support  Level Of Support Discussed With: Patient       Electronically signed by ESA Robertson, 06/01/25, 11:32 AM EDT.    Patient independently seen and evaluated, agree with assessment and plan, above documentation reflects plan put forth during bedside rounds.  More than 51% of the time of this patient encounter was performed by me.    Interval history:  Over the past 24 hours patient underwent colonoscopy this a.m. which was negative for any active bleeding    GEN: No acute distress  HEENT: Moist mucous  membranes  LUNGS: Equal chest rise bilaterally  CARDIAC: Regular rate and rhythm  NEURO: Moving all 4 extremities spontaneously  SKIN: No obvious breakdown    Plan:  Agree with assessment plan as above  Continue to monitor blood counts closely  Status post EGD and colonoscopy  Continue twice daily PPI  Avoid NSAIDs  Gastroenterology consulted, appreciate their recommendations  Advance diet as tolerates  CBC, CMP reviewed  Repeat CBC, CMP, mag and Phos in a.m.      Electronically signed by Michael Sepulveda MD, 6/1/2025, 12:06 EDT.

## 2025-06-01 NOTE — PROGRESS NOTES
[facility]  Inpatient Progress Note    Reason for Follow Up:  Rectal bleeding    Subjective     Interval History:   Pt presents to endoscopy for further evaluation of rectal bleeding.  Pt was passing red blood yesterday.    Current Facility-Administered Medications:     abiraterone acetate (ZYTIGA) tablet 1,000 mg (HOME MED), 1,000 mg, Oral, Nightly, Odell, Dimpi, DO    acetaminophen (TYLENOL) tablet 650 mg, 650 mg, Oral, Q4H PRN **OR** acetaminophen (TYLENOL) 160 MG/5ML oral solution 650 mg, 650 mg, Oral, Q4H PRN **OR** acetaminophen (TYLENOL) suppository 650 mg, 650 mg, Rectal, Q4H PRN, Odell, Dimpi, DO    albuterol (PROVENTIL) nebulizer solution 0.083% 2.5 mg/3mL, 2.5 mg, Nebulization, Q6H PRN, Odell, Dimpi, DO    atorvastatin (LIPITOR) tablet 80 mg, 80 mg, Oral, Nightly, Odell, Dimpi, DO, 80 mg at 05/31/25 2132    sennosides-docusate (PERICOLACE) 8.6-50 MG per tablet 2 tablet, 2 tablet, Oral, BID PRN **AND** polyethylene glycol (MIRALAX) packet 17 g, 17 g, Oral, Daily PRN **AND** bisacodyl (DULCOLAX) EC tablet 5 mg, 5 mg, Oral, Daily PRN **AND** bisacodyl (DULCOLAX) suppository 10 mg, 10 mg, Rectal, Daily PRN, Odell, Dimpi, DO    [Held by provider] celecoxib (CeleBREX) capsule 200 mg, 200 mg, Oral, Daily, Odell, Dimpi, DO    gabapentin (NEURONTIN) capsule 600 mg, 600 mg, Oral, Q8H, Odell, Dimpi, DO, 600 mg at 05/31/25 2131    melatonin tablet 5 mg, 5 mg, Oral, Nightly PRN, Odell, Dimpi, DO    morphine injection 1 mg, 1 mg, Intravenous, Q2H PRN, Odell, Dimpi, DO, 1 mg at 05/31/25 2145    [DISCONTINUED] HYDROmorphone (DILAUDID) injection 0.5 mg, 0.5 mg, Intravenous, Q2H PRN, 0.5 mg at 05/30/25 0743 **AND** naloxone (NARCAN) injection 0.4 mg, 0.4 mg, Intravenous, Q5 Min PRN, Odell, Dimpi, DO    ondansetron ODT (ZOFRAN-ODT) disintegrating tablet 4 mg, 4 mg, Oral, Q6H PRN **OR** ondansetron (ZOFRAN) injection 4 mg, 4 mg, Intravenous, Q6H PRN, Odell, Dimpi, DO, 4 mg at 05/30/25 0321     oxyCODONE-acetaminophen (PERCOCET) 5-325 MG per tablet 1 tablet, 1 tablet, Oral, Q8H PRN, Odell, Dimpi, DO    oxyCODONE-acetaminophen (PERCOCET) 5-325 MG per tablet 1 tablet, 1 tablet, Oral, Q6H PRN, Odell, Dimpi, DO, 1 tablet at 05/30/25 2204    pantoprazole (PROTONIX) injection 40 mg, 40 mg, Intravenous, Q12H, Devi Mccarty MD, 40 mg at 05/31/25 2132    predniSONE (DELTASONE) tablet 5 mg, 5 mg, Oral, Nightly, Odell, Dimpi, DO, 5 mg at 05/31/25 2131    prochlorperazine (COMPAZINE) injection 5 mg, 5 mg, Intravenous, Q6H PRN, 5 mg at 05/31/25 0113 **OR** prochlorperazine (COMPAZINE) tablet 5 mg, 5 mg, Oral, Q6H PRN **OR** prochlorperazine (COMPAZINE) suppository 25 mg, 25 mg, Rectal, Q12H PRN, Odell, Dimpi, DO    sodium chloride 0.9 % flush 10 mL, 10 mL, Intravenous, PRN, Marcial Elias MD    sodium chloride 0.9 % flush 10 mL, 10 mL, Intravenous, Q12H, Odell, Dimpi, DO, 10 mL at 06/01/25 0038    sodium chloride 0.9 % flush 10 mL, 10 mL, Intravenous, PRN, Odell, Dimpi, DO    sodium chloride 0.9 % infusion 40 mL, 40 mL, Intravenous, PRN, Odell, Dimpi, DO  Review of Systems:    The following systems were reviewed and negative;  constitution, respiratory, and cardiovascular    Objective     Vital Signs  Temp:  [97.1 °F (36.2 °C)-99.9 °F (37.7 °C)] 98 °F (36.7 °C)  Heart Rate:  [] 95  Resp:  [16-18] 18  BP: ()/(63-76) 110/65  Body mass index is 29.26 kg/m².    Intake/Output Summary (Last 24 hours) at 6/1/2025 0654  Last data filed at 6/1/2025 0300  Gross per 24 hour   Intake 872.25 ml   Output 2000 ml   Net -1127.75 ml     I/O this shift:  In: 332.3 [Blood:332.3]  Out: 1050 [Urine:1050]     Physical Exam:   General: awake, alert and in no acute distress   Eyes: eyes move symmetrical in all directions, no scleral icterus   Skin: warm and dry, not jaundiced   Cardiovascular: no chest tenderness   Pulm: breathing unlabored   Abdomen: soft, nontender, nondistended   Psychiatric: mental status within  "normal limits     Results Review:     I reviewed the patient's new clinical results.    Results from last 7 days   Lab Units 06/01/25  0512 05/31/25  1515 05/31/25  0536 05/30/25  0828   WBC 10*3/mm3 7.79  --  8.04 6.90   HEMOGLOBIN g/dL 8.6* 7.7* 6.2* 7.4*   HEMATOCRIT % 26.8* 22.9* 18.6* 22.4*   PLATELETS 10*3/mm3 136*  --  115* 123*     Results from last 7 days   Lab Units 06/01/25  0512 05/31/25  0536 05/30/25  0828 05/29/25  1625   SODIUM mmol/L 140 139 141 142   POTASSIUM mmol/L 3.8 3.1* 3.7 3.6   CHLORIDE mmol/L 111* 113* 116* 111*   CO2 mmol/L 17.7* 18.4* 16.9* 20.2*   BUN mg/dL 18.2 21.6 29.9* 35.5*   CREATININE mg/dL 1.07 1.15 1.13 1.21   CALCIUM mg/dL 7.8* 7.0* 7.4* 8.8   BILIRUBIN mg/dL  --   --   --  0.4   ALK PHOS U/L  --   --   --  41   ALT (SGPT) U/L  --   --   --  11   AST (SGOT) U/L  --   --   --  16   GLUCOSE mg/dL 96 133* 145* 109*     Results from last 7 days   Lab Units 05/29/25  1625   INR  1.13     No results found for: \"LIPASE\"      Assessment & Plan   Assessment:     Rectal bleeding    Plan:     - will proceed with colonoscopy for further evaluation  - benefits vs risks of procedure d/w patient; risks include but are not limited to bleeding, infection, perforation, and risk of sedation  - pt understands risks and agrees to proceed    I discussed the patients findings and my recommendations with patient.         Devi Mccarty M.D.  [place] [address] [address]  [id number]  Office: [phone]              "

## 2025-06-01 NOTE — PLAN OF CARE
Goal Outcome Evaluation:  Plan of Care Reviewed With: patient        Progress: no change  Outcome Evaluation: Patient remains alert and oriented x 4, vitals WNL. Patient recieved 1 unit of PRBCs. Patient to have endoscopy in the am. Consent signed and in chart. Bowel prep completed at 0300. Patient has had multiple BMs, completly liquid, crandberry colored. Will continue to monitor.

## 2025-06-01 NOTE — ANESTHESIA POSTPROCEDURE EVALUATION
Patient: Dwight Gooden    Procedure Summary       Date: 06/01/25 Room / Location: [facility] ENDOSCOPY 3 / [facility] ENDOSCOPY    Anesthesia Start: 0717 Anesthesia Stop: 0806    Procedure: COLONOSCOPY Diagnosis:       Acute blood loss anemia      (Acute blood loss anemia [D62])    Surgeons: Devi Mccarty MD Provider: Carlos A Wilson MD    Anesthesia Type: general ASA Status: 2 - Emergent            Anesthesia Type: general    Vitals  Vitals Value Taken Time   /82 06/01/25 08:08   Temp 36.3 °C (97.3 °F) 06/01/25 08:11   Pulse 90 06/01/25 08:11   Resp 20 06/01/25 08:11   SpO2 100 % 06/01/25 08:11           Post Anesthesia Care and Evaluation    Patient location during evaluation: bedside  Patient participation: complete - patient participated  Level of consciousness: awake and alert  Pain management: adequate    Airway patency: patent  Anesthetic complications: No anesthetic complications  PONV Status: none  Cardiovascular status: acceptable  Respiratory status: acceptable  Hydration status: acceptable    Comments: An Anesthesiologist personally participated in the most demanding procedures (including induction and emergence if applicable) in the anesthesia plan, monitored the course of anesthesia administration at frequent intervals and remained physically present and available for immediate diagnosis and treatment of emergencies.

## 2025-06-01 NOTE — PLAN OF CARE
Goal Outcome Evaluation:              Outcome Evaluation: A&Ox4. Room air. Continuous pulse ox. Treated for pain x1, see MAR.  Colonoscopy completed. Diet advanced to regular. No complaints at this time. Call light in reach. Spouse at bedside most of shift.

## 2025-06-01 NOTE — ANESTHESIA PREPROCEDURE EVALUATION
Anesthesia Evaluation     Patient summary reviewed and Nursing notes reviewed   no history of anesthetic complications:   NPO Solid Status: > 8 hours  NPO Liquid Status: > 2 hours           Airway   Mallampati: II  TM distance: >3 FB  Neck ROM: full  No difficulty expected  Dental      Pulmonary - negative pulmonary ROS and normal exam    breath sounds clear to auscultation  Cardiovascular - normal exam  Exercise tolerance: good (4-7 METS)    Rhythm: regular  Rate: normal    (+) hyperlipidemia      Neuro/Psych- negative ROS  GI/Hepatic/Renal/Endo    (+) GI bleeding lower     Musculoskeletal (-) negative ROS    Abdominal    Substance History - negative use     OB/GYN negative ob/gyn ROS         Other      history of cancer    ROS/Med Hx Other: PAT Nursing Notes unavailable.               Anesthesia Plan    ASA 2 - emergent     general     (Total IV Anesthesia  Prbs received yestrerday    Patient understands anesthesia not responsible for dental damage.  )  intravenous induction     Anesthetic plan, risks, benefits, and alternatives have been provided, discussed and informed consent has been obtained with: patient.  Pre-procedure education provided  Use of blood products discussed with patient .    Plan discussed with CRNA.    CODE STATUS:    Code Status (Patient has no pulse and is not breathing): CPR (Attempt to Resuscitate)  Medical Interventions (Patient has pulse or is breathing): Full Support  Level Of Support Discussed With: Patient

## 2025-06-02 LAB
ALBUMIN SERPL-MCNC: 2.7 G/DL (ref 3.5–5.2)
ALBUMIN/GLOB SERPL: 1.8 G/DL
ALP SERPL-CCNC: 35 U/L (ref 39–117)
ALT SERPL W P-5'-P-CCNC: 14 U/L (ref 1–41)
ANION GAP SERPL CALCULATED.3IONS-SCNC: 7.2 MMOL/L (ref 5–15)
AST SERPL-CCNC: 22 U/L (ref 1–40)
BH BB BLOOD EXPIRATION DATE: NORMAL
BH BB BLOOD TYPE BARCODE: 5100
BH BB DISPENSE STATUS: NORMAL
BH BB PRODUCT CODE: NORMAL
BH BB UNIT NUMBER: NORMAL
BILIRUB SERPL-MCNC: 0.4 MG/DL (ref 0–1.2)
BUN SERPL-MCNC: 15.4 MG/DL (ref 8–23)
BUN/CREAT SERPL: 16.6 (ref 7–25)
CALCIUM SPEC-SCNC: 7.6 MG/DL (ref 8.6–10.5)
CHLORIDE SERPL-SCNC: 112 MMOL/L (ref 98–107)
CO2 SERPL-SCNC: 20.8 MMOL/L (ref 22–29)
CREAT SERPL-MCNC: 0.93 MG/DL (ref 0.76–1.27)
CROSSMATCH INTERPRETATION: NORMAL
CYTO UR: NORMAL
DEPRECATED RDW RBC AUTO: 49.6 FL (ref 37–54)
EGFRCR SERPLBLD CKD-EPI 2021: 84.6 ML/MIN/1.73
ERYTHROCYTE [DISTWIDTH] IN BLOOD BY AUTOMATED COUNT: 15.6 % (ref 12.3–15.4)
GLOBULIN UR ELPH-MCNC: 1.5 GM/DL
GLUCOSE SERPL-MCNC: 109 MG/DL (ref 65–99)
HCT VFR BLD AUTO: 21.7 % (ref 37.5–51)
HCT VFR BLD AUTO: 21.7 % (ref 37.5–51)
HCT VFR BLD AUTO: 22.3 % (ref 37.5–51)
HGB BLD-MCNC: 7.3 G/DL (ref 13–17.7)
LAB AP CASE REPORT: NORMAL
LAB AP CLINICAL INFORMATION: NORMAL
MAGNESIUM SERPL-MCNC: 1.8 MG/DL (ref 1.6–2.4)
MCH RBC QN AUTO: 29.8 PG (ref 26.6–33)
MCHC RBC AUTO-ENTMCNC: 33.6 G/DL (ref 31.5–35.7)
MCV RBC AUTO: 88.6 FL (ref 79–97)
PATH REPORT.FINAL DX SPEC: NORMAL
PATH REPORT.GROSS SPEC: NORMAL
PHOSPHATE SERPL-MCNC: 3.6 MG/DL (ref 2.5–4.5)
PLATELET # BLD AUTO: 147 10*3/MM3 (ref 140–450)
PMV BLD AUTO: 9.3 FL (ref 6–12)
POTASSIUM SERPL-SCNC: 3.6 MMOL/L (ref 3.5–5.2)
PROT SERPL-MCNC: 4.2 G/DL (ref 6–8.5)
RBC # BLD AUTO: 2.45 10*6/MM3 (ref 4.14–5.8)
SODIUM SERPL-SCNC: 140 MMOL/L (ref 136–145)
UNIT  ABO: NORMAL
UNIT  RH: NORMAL
WBC NRBC COR # BLD AUTO: 6.21 10*3/MM3 (ref 3.4–10.8)

## 2025-06-02 PROCEDURE — 25010000002 MORPHINE PER 10 MG: Performed by: INTERNAL MEDICINE

## 2025-06-02 PROCEDURE — 83735 ASSAY OF MAGNESIUM: CPT | Performed by: PHYSICIAN ASSISTANT

## 2025-06-02 PROCEDURE — 85027 COMPLETE CBC AUTOMATED: CPT | Performed by: PHYSICIAN ASSISTANT

## 2025-06-02 PROCEDURE — 99231 SBSQ HOSP IP/OBS SF/LOW 25: CPT | Performed by: INTERNAL MEDICINE

## 2025-06-02 PROCEDURE — 97165 OT EVAL LOW COMPLEX 30 MIN: CPT

## 2025-06-02 PROCEDURE — 85014 HEMATOCRIT: CPT | Performed by: INTERNAL MEDICINE

## 2025-06-02 PROCEDURE — 85018 HEMOGLOBIN: CPT | Performed by: INTERNAL MEDICINE

## 2025-06-02 PROCEDURE — 97161 PT EVAL LOW COMPLEX 20 MIN: CPT

## 2025-06-02 PROCEDURE — 80053 COMPREHEN METABOLIC PANEL: CPT | Performed by: PHYSICIAN ASSISTANT

## 2025-06-02 PROCEDURE — 99232 SBSQ HOSP IP/OBS MODERATE 35: CPT | Performed by: INTERNAL MEDICINE

## 2025-06-02 PROCEDURE — 84100 ASSAY OF PHOSPHORUS: CPT | Performed by: PHYSICIAN ASSISTANT

## 2025-06-02 PROCEDURE — 63710000001 PREDNISONE PER 5 MG: Performed by: PHYSICIAN ASSISTANT

## 2025-06-02 RX ORDER — FERROUS SULFATE 325(65) MG
325 TABLET ORAL
Status: DISCONTINUED | OUTPATIENT
Start: 2025-06-03 | End: 2025-06-05 | Stop reason: HOSPADM

## 2025-06-02 RX ORDER — PREDNISONE 10 MG/1
5 TABLET ORAL NIGHTLY
Status: DISCONTINUED | OUTPATIENT
Start: 2025-06-02 | End: 2025-06-05 | Stop reason: HOSPADM

## 2025-06-02 RX ADMIN — GABAPENTIN 600 MG: 300 CAPSULE ORAL at 14:44

## 2025-06-02 RX ADMIN — PREDNISONE 5 MG: 10 TABLET ORAL at 21:35

## 2025-06-02 RX ADMIN — PANTOPRAZOLE SODIUM 40 MG: 40 INJECTION, POWDER, FOR SOLUTION INTRAVENOUS at 08:23

## 2025-06-02 RX ADMIN — Medication 10 ML: at 21:35

## 2025-06-02 RX ADMIN — MORPHINE SULFATE 1 MG: 2 INJECTION, SOLUTION INTRAMUSCULAR; INTRAVENOUS at 21:34

## 2025-06-02 RX ADMIN — MORPHINE SULFATE 1 MG: 2 INJECTION, SOLUTION INTRAMUSCULAR; INTRAVENOUS at 08:39

## 2025-06-02 RX ADMIN — GABAPENTIN 600 MG: 300 CAPSULE ORAL at 21:35

## 2025-06-02 RX ADMIN — GABAPENTIN 600 MG: 300 CAPSULE ORAL at 08:39

## 2025-06-02 RX ADMIN — PANTOPRAZOLE SODIUM 40 MG: 40 INJECTION, POWDER, FOR SOLUTION INTRAVENOUS at 21:35

## 2025-06-02 RX ADMIN — Medication 10 ML: at 08:23

## 2025-06-02 RX ADMIN — ATORVASTATIN CALCIUM 80 MG: 40 TABLET, FILM COATED ORAL at 21:35

## 2025-06-02 NOTE — PLAN OF CARE
Goal Outcome Evaluation:  Plan of Care Reviewed With: patient           Outcome Evaluation: Patient presents with no deficits that are limiting functional mobility. Able to ambulate with nursing staff. No rehab services required at this time.    Anticipated Discharge Disposition (PT): home
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
## 2025-06-02 NOTE — PROGRESS NOTES
[facility]  Inpatient Progress Note    Reason for Follow Up:  Rectal bleeding    Subjective     Interval History:   Patient had EGD on 05/30/2025  Findings included-  Gastritis and multiple non-bleeding angioectasias in duodenum    Patient had colonoscopy on 06/01/2025  Findings included-  Preparation of the colon was inadequate.  Examined ileum was normal.  Mild diverticulosis in the sigmoid colon.  Blood in the rectum, rectosigmoid colon, sigmoid colon, and descending colon.    Patient is feeling much better  No nausea and vomiting, abdominal pain has improved  He had one BM since scopes and there was small amount of blood  He is eating and drinking good    Current Facility-Administered Medications:     abiraterone acetate (ZYTIGA) tablet 1,000 mg (HOME MED), 1,000 mg, Oral, Nightly, Devi Mccarty MD    acetaminophen (TYLENOL) tablet 650 mg, 650 mg, Oral, Q4H PRN **OR** acetaminophen (TYLENOL) 160 MG/5ML oral solution 650 mg, 650 mg, Oral, Q4H PRN **OR** acetaminophen (TYLENOL) suppository 650 mg, 650 mg, Rectal, Q4H PRN, Devi Mccarty MD    albuterol (PROVENTIL) nebulizer solution 0.083% 2.5 mg/3mL, 2.5 mg, Nebulization, Q6H PRN, Devi Mccarty MD    atorvastatin (LIPITOR) tablet 80 mg, 80 mg, Oral, Nightly, Devi Mccarty MD, 80 mg at 06/01/25 2101    sennosides-docusate (PERICOLACE) 8.6-50 MG per tablet 2 tablet, 2 tablet, Oral, BID PRN **AND** polyethylene glycol (MIRALAX) packet 17 g, 17 g, Oral, Daily PRN **AND** bisacodyl (DULCOLAX) EC tablet 5 mg, 5 mg, Oral, Daily PRN **AND** bisacodyl (DULCOLAX) suppository 10 mg, 10 mg, Rectal, Daily PRN, Devi Mccarty MD    [Held by provider] celecoxib (CeleBREX) capsule 200 mg, 200 mg, Oral, Daily, Devi Mccarty MD    gabapentin (NEURONTIN) capsule 600 mg, 600 mg, Oral, Q8H, Devi Mccarty MD, 600 mg at 06/02/25 0839    melatonin tablet 5 mg, 5 mg, Oral, Nightly PRN, Bibiana,  Devi Mccabe MD    morphine injection 1 mg, 1 mg, Intravenous, Q2H PRN, Devi Mccarty MD, 1 mg at 06/02/25 0839    [DISCONTINUED] HYDROmorphone (DILAUDID) injection 0.5 mg, 0.5 mg, Intravenous, Q2H PRN, 0.5 mg at 05/30/25 0743 **AND** naloxone (NARCAN) injection 0.4 mg, 0.4 mg, Intravenous, Q5 Min PRN, Devi Mccarty MD    ondansetron ODT (ZOFRAN-ODT) disintegrating tablet 4 mg, 4 mg, Oral, Q6H PRN **OR** ondansetron (ZOFRAN) injection 4 mg, 4 mg, Intravenous, Q6H PRN, Devi Mccarty MD, 4 mg at 05/30/25 1337    oxyCODONE-acetaminophen (PERCOCET) 5-325 MG per tablet 1 tablet, 1 tablet, Oral, Q8H PRN, Devi Mccarty MD    oxyCODONE-acetaminophen (PERCOCET) 5-325 MG per tablet 1 tablet, 1 tablet, Oral, Q6H PRN, Devi Mccarty MD, 1 tablet at 05/30/25 2204    pantoprazole (PROTONIX) injection 40 mg, 40 mg, Intravenous, Q12H, Devi Mccarty MD, 40 mg at 06/02/25 0823    predniSONE (DELTASONE) tablet 5 mg, 5 mg, Oral, Nightly, Devi Mccarty MD, 5 mg at 06/01/25 2101    prochlorperazine (COMPAZINE) injection 5 mg, 5 mg, Intravenous, Q6H PRN, 5 mg at 05/31/25 0113 **OR** prochlorperazine (COMPAZINE) tablet 5 mg, 5 mg, Oral, Q6H PRN **OR** prochlorperazine (COMPAZINE) suppository 25 mg, 25 mg, Rectal, Q12H PRN, Devi Mccarty MD    sodium chloride 0.9 % flush 10 mL, 10 mL, Intravenous, PRN, Devi Mccarty, MD    sodium chloride 0.9 % flush 10 mL, 10 mL, Intravenous, Q12H, Devi Mccarty MD, 10 mL at 06/02/25 0823    sodium chloride 0.9 % flush 10 mL, 10 mL, Intravenous, PRN, Devi Mccarty MD    sodium chloride 0.9 % infusion 40 mL, 40 mL, Intravenous, PRN, Devi Mccarty MD  Review of Systems:    Pertinent items are noted in HPI    Objective     Vital Signs  Temp:  [97.7 °F (36.5 °C)-99 °F (37.2 °C)] 98.9 °F (37.2 °C)  Heart Rate:  [] 96  Resp:  [16-18] 16  BP: ()/(47-85) 114/79  Body mass index is  "26.61 kg/m².    Intake/Output Summary (Last 24 hours) at 6/2/2025 1001  Last data filed at 6/2/2025 0813  Gross per 24 hour   Intake 240 ml   Output --   Net 240 ml     I/O this shift:  In: 240 [P.O.:240]  Out: -      Physical Exam:   General: awake, alert and in no acute distress   Eyes: eyes move symmetrical in all directions, no scleral icterus   Neck: supple, trachea is midline   Skin: warm and dry, not jaundiced   Cardiovascular: regular rhythm and rate   Pulm: breathing unlabored   Abdomen: soft, non tender, non distended   Rectal: deferred   Extremities: no rash or edema   Psychiatric: mental status within normal limits, alert and oriented      Results Review:     I reviewed the patient's new clinical results.    Results from last 7 days   Lab Units 06/02/25  0803 06/01/25  2348 06/01/25  1551 06/01/25  0844 06/01/25  0512 05/31/25  1515 05/31/25  0536   WBC 10*3/mm3 6.21  --   --   --  7.79  --  8.04   HEMOGLOBIN g/dL 7.3*  7.3* 7.8* 8.6*   < > 8.6*   < > 6.2*   HEMATOCRIT % 21.7*  21.7* 24.2* 26.1*   < > 26.8*   < > 18.6*   PLATELETS 10*3/mm3 147  --   --   --  136*  --  115*    < > = values in this interval not displayed.     Results from last 7 days   Lab Units 06/02/25  0803 06/01/25  0512 05/31/25  0536 05/30/25  0828 05/29/25  1625   SODIUM mmol/L 140 140 139   < > 142   POTASSIUM mmol/L 3.6 3.8 3.1*   < > 3.6   CHLORIDE mmol/L 112* 111* 113*   < > 111*   CO2 mmol/L 20.8* 17.7* 18.4*   < > 20.2*   BUN mg/dL 15.4 18.2 21.6   < > 35.5*   CREATININE mg/dL 0.93 1.07 1.15   < > 1.21   CALCIUM mg/dL 7.6* 7.8* 7.0*   < > 8.8   BILIRUBIN mg/dL 0.4  --   --   --  0.4   ALK PHOS U/L 35*  --   --   --  41   ALT (SGPT) U/L 14  --   --   --  11   AST (SGOT) U/L 22  --   --   --  16   GLUCOSE mg/dL 109* 96 133*   < > 109*    < > = values in this interval not displayed.     Results from last 7 days   Lab Units 05/29/25  1625   INR  1.13     No results found for: \"LIPASE\"    Radiology:    No radiology results for " the last day      Assessment & Plan   Assessment:   Rectal bleeding      Plan:   Patient will ultimately need to have colonoscopy outpatient due to poor prep-if patient wants to   Patient needs to continue PPI  Likely rectal bleeding due to diverticular bleed-continue to monitor H & H   Patient understands and agrees to the plan    I discussed the patients findings and my recommendations with patient.      Electronically signed by JIMI Sparrow, 6/2/2025, 10:01 EDT.    Attending Attestation  I reviewed the below documentation and evaluation and discussed the care plan with JIMI Sparrow, I agree with her findings and plan as documented.    Pt seen and examined by me.  Pt reports no bowel movements today.  Had abd pain this AM but none currently.  Abd soft, nt, nd.  Findings on endoscopy most suggestive of diverticular bleed.  Continue to monitor Hgb and transfuse prn.  Recommend CTA if recurrent rectal bleeding.    Electronically signed by Devi Mccarty MD, 06/02/25, 1:05 PM EDT.    Portions of this documentation were transcribed electronically from a voice recognition software.  I confirm all data accurately represents the service(s) I performed at today's visit.

## 2025-06-02 NOTE — PLAN OF CARE
Goal Outcome Evaluation:  Plan of Care Reviewed With: patient        Progress: no change (first session for evaluation)  Outcome Evaluation: Skilled OT services are not indicated at this time as patient is remains independent with basic ADLs. He may benefit from tub transfer bench and 3-1 commode upon return home to increase ease/ safety of ADL task completion. Will DC OT with patient in agreement.    Anticipated Discharge Disposition (OT): home

## 2025-06-02 NOTE — PROGRESS NOTES
[facility]   Hospitalist Progress Note       Patient Name: Dwight Gooden  : 1945  MRN: [id number]  Primary Care Physician: Elías Quintana MD  Date of admission: 2025  Today's Date: 2025  Room / Bed:   [facility]/1  Subjective   Chief Complaint: Weakness    Summary:  Patient is a 79-year-old male who presents to the ER for weakness and shortness of breath that has been going on for 2 weeks.  However the intensity of his symptoms increased today.  Patient does report some bloody stool and some abdominal pain.  Patient was recently started on diclofenac and tramadol 1 month ago.  On arrival to the ED, patient had temperature 98.1, pulse of 99, respiratory rate of 18, blood pressure of 86/63, and he saturating 100% on room air.   On labs, patient's troponin is 13, sodium is 142, chloride is 111, glucose is 109, pro time is 1.13.  Hemoglobin is 5.0.  MCV is 98.1.       Interval Followup: 2025    Very pleasant gentleman.  In good spirits.  Alert and conversational.  No distress currently.  Had bowel movement with small amount of dark blood  Appetite has been good since EGD/colonoscopy.  Hemoglobin drifting down 7.3  Overall, he is feeling much better after recent blood transfusion  Disposition: Continue to trend hemoglobin.  Advance diet.  Iron supplement      REVIEW OF SYSTEMS:   Weakness  Objective   Temp:  [97.7 °F (36.5 °C)-99 °F (37.2 °C)] 97.9 °F (36.6 °C)  Heart Rate:  [] 84  Resp:  [16-18] 16  BP: ()/(57-85) 91/62  PHYSICAL EXAM   CON: WN. WD. NAD.   NECK:  No stridor.   RESP:  No wheezes. No crackles.  No work of breathing.   CV:  RRR. No murmur noted.  No edema.  GI:  Soft and nontender.    EXT: No cyanosis or clubbing.  PSYCH:  Alert. Oriented. Calm mood.  NEURO:  No dysarthria or aphasia.   SKIN: No chronic venous stasis changes or varicosities.         Results from last 7 days   Lab Units 25  0803 25  2348 25  1551 25  0844 25  0512  05/31/25  1515 05/31/25  0536 05/30/25  0828 05/29/25  1625   WBC 10*3/mm3 6.21  --   --   --  7.79  --  8.04 6.90 7.02   HEMOGLOBIN g/dL 7.3*  7.3* 7.8* 8.6* 8.5* 8.6* 7.7* 6.2* 7.4* 5.0*   HEMATOCRIT % 21.7*  21.7* 24.2* 26.1* 25.6* 26.8* 22.9* 18.6* 22.4* 15.7*   PLATELETS 10*3/mm3 147  --   --   --  136*  --  115* 123* 165     Results from last 7 days   Lab Units 06/02/25  0803 06/01/25  0512 05/31/25  0536 05/30/25  0828 05/29/25  1625   SODIUM mmol/L 140 140 139 141 142   POTASSIUM mmol/L 3.6 3.8 3.1* 3.7 3.6   CO2 mmol/L 20.8* 17.7* 18.4* 16.9* 20.2*   CHLORIDE mmol/L 112* 111* 113* 116* 111*   ANION GAP mmol/L 7.2 11.3 7.6 8.1 10.8   BUN mg/dL 15.4 18.2 21.6 29.9* 35.5*   CREATININE mg/dL 0.93 1.07 1.15 1.13 1.21   GLUCOSE mg/dL 109* 96 133* 145* 109*     Results from last 7 days   Lab Units 05/29/25  1625   INR  1.13     Assessment / Plan   Assessment:    Anemia  GI bleeding  Hypokalemia  Hx prostate cancer  Hx neuropathy  Hx HTN with recent hypotensive readings  Status post EGD 5/30, with argon plasma coagulation of nonbleeding duodenal angioectasia  Status post colonoscopy 6/1, poor prep with findings of resolved diverticular bleed     Plan:    Monitored bed   appreciate gastroenterology consult/evaluation  Status post EGD and colonoscopy  Twice daily PPI  Advance diet  Monitor serial hemoglobin  Holding home NSAIDs/Celebrex  Continue home meds for prostate cancer (Zytiga, prednisone)  Avoid NSAIDs/AC/antiplatelets  Replace electrolytes/potassium as needed  Continue home medications, but holding antihypertensives in the setting of soft BP    Discussed plan with RN.  VTE Prophylaxis:  Mechanical VTE prophylaxis orders are present.      CODE STATUS:      Code Status (Patient has no pulse and is not breathing): CPR (Attempt to Resuscitate)  Medical Interventions (Patient has pulse or is breathing): Full Support  Level Of Support Discussed With: Patient       Electronically signed by ESA Robertson,  06/01/25, 11:32 AM EDT.    Patient independently seen and evaluated, agree with assessment and plan, above documentation reflects plan put forth during bedside rounds.  More than 51% of the time of this patient encounter was performed by me.    Interval history:  Over the past 24 hours patient did have dark tarry stool    GEN: No acute distress  HEENT: Moist mucous membranes  LUNGS: Equal chest rise bilaterally  CARDIAC: Regular rate and rhythm  NEURO: Moving all 4 extremities spontaneously  SKIN: No obvious breakdown    Plan:  Agree with assessment plan as above  Continue to monitor blood counts closely  Status post EGD and colonoscopy  Continue twice daily PPI  Avoid NSAIDs  Gastroenterology consulted, appreciate their recommendations  Advance diet as tolerates  CBC, CMP reviewed, continue to monitor hemoglobin  Repeat CBC, CMP, mag and Phos in a.m.      Electronically signed by Michael Sepulveda MD, 6/2/2025, 13:23 EDT.

## 2025-06-02 NOTE — PLAN OF CARE
Goal Outcome Evaluation:  Plan of Care Reviewed With: patient        Progress: improving  Outcome Evaluation: Patient remains alert and oriented, post colonoscopy, no complaints of ABD discomfort. Vitals WNL. Now on regular diet, tolerating it well. Ambulating in room with cane. Pain controled with PRN med. Call light within reach.

## 2025-06-03 DIAGNOSIS — A04.8 H. PYLORI INFECTION: Primary | ICD-10-CM

## 2025-06-03 LAB
ABO GROUP BLD: NORMAL
ANION GAP SERPL CALCULATED.3IONS-SCNC: 7.5 MMOL/L (ref 5–15)
BLD GP AB SCN SERPL QL: NEGATIVE
BUN SERPL-MCNC: 15.3 MG/DL (ref 8–23)
BUN/CREAT SERPL: 16.1 (ref 7–25)
CALCIUM SPEC-SCNC: 7.3 MG/DL (ref 8.6–10.5)
CHLORIDE SERPL-SCNC: 111 MMOL/L (ref 98–107)
CO2 SERPL-SCNC: 20.5 MMOL/L (ref 22–29)
CREAT SERPL-MCNC: 0.95 MG/DL (ref 0.76–1.27)
DEPRECATED RDW RBC AUTO: 48.9 FL (ref 37–54)
EGFRCR SERPLBLD CKD-EPI 2021: 82.4 ML/MIN/1.73
ERYTHROCYTE [DISTWIDTH] IN BLOOD BY AUTOMATED COUNT: 15.3 % (ref 12.3–15.4)
GLUCOSE SERPL-MCNC: 119 MG/DL (ref 65–99)
HCT VFR BLD AUTO: 20.7 % (ref 37.5–51)
HCT VFR BLD AUTO: 23.1 % (ref 37.5–51)
HCT VFR BLD AUTO: 23.1 % (ref 37.5–51)
HGB BLD-MCNC: 6.9 G/DL (ref 13–17.7)
HGB BLD-MCNC: 7.5 G/DL (ref 13–17.7)
HGB BLD-MCNC: 7.5 G/DL (ref 13–17.7)
MCH RBC QN AUTO: 29.4 PG (ref 26.6–33)
MCHC RBC AUTO-ENTMCNC: 32.5 G/DL (ref 31.5–35.7)
MCV RBC AUTO: 90.6 FL (ref 79–97)
PLATELET # BLD AUTO: 169 10*3/MM3 (ref 140–450)
PMV BLD AUTO: 9.2 FL (ref 6–12)
POTASSIUM SERPL-SCNC: 3.4 MMOL/L (ref 3.5–5.2)
RBC # BLD AUTO: 2.55 10*6/MM3 (ref 4.14–5.8)
RH BLD: POSITIVE
SODIUM SERPL-SCNC: 139 MMOL/L (ref 136–145)
T&S EXPIRATION DATE: NORMAL
WBC NRBC COR # BLD AUTO: 5.15 10*3/MM3 (ref 3.4–10.8)

## 2025-06-03 PROCEDURE — 86900 BLOOD TYPING SEROLOGIC ABO: CPT

## 2025-06-03 PROCEDURE — 86923 COMPATIBILITY TEST ELECTRIC: CPT

## 2025-06-03 PROCEDURE — 85014 HEMATOCRIT: CPT | Performed by: INTERNAL MEDICINE

## 2025-06-03 PROCEDURE — 85018 HEMOGLOBIN: CPT | Performed by: INTERNAL MEDICINE

## 2025-06-03 PROCEDURE — 86850 RBC ANTIBODY SCREEN: CPT

## 2025-06-03 PROCEDURE — 86901 BLOOD TYPING SEROLOGIC RH(D): CPT

## 2025-06-03 PROCEDURE — 63710000001 PREDNISONE PER 5 MG: Performed by: PHYSICIAN ASSISTANT

## 2025-06-03 PROCEDURE — 80048 BASIC METABOLIC PNL TOTAL CA: CPT | Performed by: INTERNAL MEDICINE

## 2025-06-03 PROCEDURE — 99233 SBSQ HOSP IP/OBS HIGH 50: CPT | Performed by: INTERNAL MEDICINE

## 2025-06-03 PROCEDURE — 85027 COMPLETE CBC AUTOMATED: CPT | Performed by: PHYSICIAN ASSISTANT

## 2025-06-03 PROCEDURE — P9016 RBC LEUKOCYTES REDUCED: HCPCS

## 2025-06-03 PROCEDURE — 36430 TRANSFUSION BLD/BLD COMPNT: CPT

## 2025-06-03 PROCEDURE — 25010000002 MORPHINE PER 10 MG: Performed by: INTERNAL MEDICINE

## 2025-06-03 RX ORDER — OXYCODONE AND ACETAMINOPHEN 5; 325 MG/1; MG/1
1 TABLET ORAL EVERY 6 HOURS PRN
Refills: 0 | Status: DISCONTINUED | OUTPATIENT
Start: 2025-06-03 | End: 2025-06-05 | Stop reason: HOSPADM

## 2025-06-03 RX ORDER — BISMUTH SUBCITRATE POTASSIUM, METRONIDAZOLE AND TETRACYCLINE HYDROCHLORIDE 140; 125; 125 MG/1; MG/1; MG/1
3 CAPSULE ORAL
Qty: 120 CAPSULE | Refills: 0 | Status: SHIPPED | OUTPATIENT
Start: 2025-06-03 | End: 2025-06-13

## 2025-06-03 RX ADMIN — MORPHINE SULFATE 1 MG: 2 INJECTION, SOLUTION INTRAMUSCULAR; INTRAVENOUS at 21:19

## 2025-06-03 RX ADMIN — MORPHINE SULFATE 1 MG: 2 INJECTION, SOLUTION INTRAMUSCULAR; INTRAVENOUS at 08:53

## 2025-06-03 RX ADMIN — PANTOPRAZOLE SODIUM 40 MG: 40 INJECTION, POWDER, FOR SOLUTION INTRAVENOUS at 08:39

## 2025-06-03 RX ADMIN — GABAPENTIN 600 MG: 300 CAPSULE ORAL at 21:11

## 2025-06-03 RX ADMIN — MORPHINE SULFATE 1 MG: 2 INJECTION, SOLUTION INTRAMUSCULAR; INTRAVENOUS at 05:30

## 2025-06-03 RX ADMIN — Medication 10 ML: at 21:11

## 2025-06-03 RX ADMIN — MORPHINE SULFATE 1 MG: 2 INJECTION, SOLUTION INTRAMUSCULAR; INTRAVENOUS at 18:45

## 2025-06-03 RX ADMIN — OXYCODONE HYDROCHLORIDE AND ACETAMINOPHEN 1 TABLET: 5; 325 TABLET ORAL at 21:10

## 2025-06-03 RX ADMIN — GABAPENTIN 600 MG: 300 CAPSULE ORAL at 14:16

## 2025-06-03 RX ADMIN — GABAPENTIN 600 MG: 300 CAPSULE ORAL at 05:30

## 2025-06-03 RX ADMIN — PANTOPRAZOLE SODIUM 40 MG: 40 INJECTION, POWDER, FOR SOLUTION INTRAVENOUS at 21:11

## 2025-06-03 RX ADMIN — FERROUS SULFATE TAB 325 MG (65 MG ELEMENTAL FE) 325 MG: 325 (65 FE) TAB at 08:39

## 2025-06-03 RX ADMIN — Medication 10 ML: at 08:40

## 2025-06-03 RX ADMIN — MORPHINE SULFATE 1 MG: 2 INJECTION, SOLUTION INTRAMUSCULAR; INTRAVENOUS at 14:15

## 2025-06-03 RX ADMIN — MORPHINE SULFATE 1 MG: 2 INJECTION, SOLUTION INTRAMUSCULAR; INTRAVENOUS at 01:05

## 2025-06-03 RX ADMIN — ATORVASTATIN CALCIUM 80 MG: 40 TABLET, FILM COATED ORAL at 21:11

## 2025-06-03 RX ADMIN — PREDNISONE 5 MG: 10 TABLET ORAL at 21:10

## 2025-06-03 NOTE — PLAN OF CARE
Goal Outcome Evaluation:              Outcome Evaluation: VSS on room air. Up in room adlib. H&H was 6.9 and 20.7. 1 unit of PRBC's infused w/o adverse reaction.  MSO4 prn pain x 3 effective per patient.  Patient is concerned about where bleeding is coming from. No BM this shift.  No acute distress noted.  Care plan continues. Call bell within reach at all times for needs and safety.

## 2025-06-03 NOTE — PROGRESS NOTES
[facility]   Hospitalist Progress Note       Patient Name: Dwight Gooden  : 1945  MRN: [id number]  Primary Care Physician: Elías Quintana MD  Date of admission: 2025  Today's Date: 6/3/2025  Room / Bed:   [facility]/1  Subjective   Chief Complaint: Weakness    Summary:  Patient is a 79-year-old male who presents to the ER for weakness and shortness of breath that has been going on for 2 weeks.  However the intensity of his symptoms increased today.  Patient does report some bloody stool and some abdominal pain.  Patient was recently started on diclofenac and tramadol 1 month ago.  On arrival to the ED, patient had temperature 98.1, pulse of 99, respiratory rate of 18, blood pressure of 86/63, and he saturating 100% on room air.   On labs, patient's troponin is 13, sodium is 142, chloride is 111, glucose is 109, pro time is 1.13.  Hemoglobin is 5.0.  MCV is 98.1.       Interval Followup: 6/3/2025    Very pleasant gentleman.   Hgb drifted to 6.9 yesterday.  RBCs last night.  Hgb 7.5 on a.m. CBC.  Reports some lower abdominal discomfort  Appetite has been good since procedures  Overall, he is feeling much better, but concerned about blood loss source    REVIEW OF SYSTEMS:   Weakness  Objective   Temp:  [97.9 °F (36.6 °C)-99.3 °F (37.4 °C)] 97.9 °F (36.6 °C)  Heart Rate:  [] 74  Resp:  [18-20] 18  BP: ()/(60-85) 116/85  PHYSICAL EXAM   CON: WN. WD. NAD.   NECK:  No stridor.   RESP:  No wheezes. No crackles.  No work of breathing.   CV:  RRR. No murmur noted.  No edema.  GI:  Soft and nontender.    EXT: No cyanosis or clubbing.  PSYCH:  Alert. Oriented. Calm mood.  NEURO:  No dysarthria or aphasia.   SKIN: No chronic venous stasis changes or varicosities.         Results from last 7 days   Lab Units 25  0750 25  2351 25  1535 25  0803 25  2348 25  1551 25  0844 25  0512 25  1515 25  0536 25  0828 25  1625   WBC 10*3/mm3  5.15  --   --  6.21  --   --   --  7.79  --  8.04 6.90 7.02   HEMOGLOBIN g/dL 7.5*  7.5* 6.9* 7.3* 7.3*  7.3* 7.8* 8.6* 8.5* 8.6*   < > 6.2* 7.4* 5.0*   HEMATOCRIT % 23.1*  23.1* 20.7* 22.3* 21.7*  21.7* 24.2* 26.1* 25.6* 26.8*   < > 18.6* 22.4* 15.7*   PLATELETS 10*3/mm3 169  --   --  147  --   --   --  136*  --  115* 123* 165    < > = values in this interval not displayed.     Results from last 7 days   Lab Units 06/03/25  0750 06/02/25  0803 06/01/25  0512 05/31/25  0536 05/30/25  0828 05/29/25  1625   SODIUM mmol/L 139 140 140 139 141 142   POTASSIUM mmol/L 3.4* 3.6 3.8 3.1* 3.7 3.6   CO2 mmol/L 20.5* 20.8* 17.7* 18.4* 16.9* 20.2*   CHLORIDE mmol/L 111* 112* 111* 113* 116* 111*   ANION GAP mmol/L 7.5 7.2 11.3 7.6 8.1 10.8   BUN mg/dL 15.3 15.4 18.2 21.6 29.9* 35.5*   CREATININE mg/dL 0.95 0.93 1.07 1.15 1.13 1.21   GLUCOSE mg/dL 119* 109* 96 133* 145* 109*     Results from last 7 days   Lab Units 05/29/25  1625   INR  1.13     Assessment / Plan   Assessment:    Anemia  GI bleeding  Hypokalemia  Hx prostate cancer  Hx neuropathy  Hx HTN with recent hypotensive readings  Status post EGD 5/30, with argon plasma coagulation of nonbleeding duodenal angioectasia  Status post colonoscopy 6/1, poor prep with findings of resolved diverticular bleed     Plan:    Monitored bed.  Continue to monitor blood counts closely.  Transfuse Hgb < 7 or acute bleeding.  Appreciate gastroenterology consult/evaluation  Status post EGD and colonoscopy  Twice daily PPI  Advance diet  Holding home NSAIDs/Celebrex  Continue home meds for prostate cancer (Zytiga, prednisone)  Avoid NSAIDs/AC/antiplatelets  Replace electrolytes/potassium as needed  Continue home medications, but holding antihypertensives in the setting of soft BP    Discussed plan with RN.  VTE Prophylaxis:  Mechanical VTE prophylaxis orders are present.      CODE STATUS:      Code Status (Patient has no pulse and is not breathing): CPR (Attempt to Resuscitate)  Medical  Interventions (Patient has pulse or is breathing): Full Support  Level Of Support Discussed With: Patient         Attending Documentation:  Patient independently seen and evaluated, above documentation reflects plan put forth during bedside rounds.  More than 51% of the time of this patient encounter was performed by me. I discussed the care plan with DANIELA Vargas PA-C, I agree with his findings and plan as documented, what I have added to the care plan and modified is as follows in my documentation and my medical decision making; very pleasant 79-year-old male presenting with weakness and shortness of breath, was found to be severely anemic with a hemoglobin of 5.  He has received a total of 5 units of blood since admitting, hemoglobin 7.5 today.  Had a 6.9 hemoglobin overnight, got 1 unit of blood and came up to 7.5.  He reports feeling remarkably better.  No dizziness, he is not having any bowel movements today, no obvious bleeding.  Underwent EGD and colonoscopy.  Appreciate GI input.  I would like to monitor his hemoglobin again overnight to see the trend.  Electronically signed by Manoj Rios MD, 06/03/25, 5:52 PM EDT.

## 2025-06-03 NOTE — PROGRESS NOTES
[facility]  Inpatient Progress Note    Reason for Follow Up:  Rectal bleeding     Subjective     Interval History:   Patient is doing good  He has no signs of bleeding-no more rectal bleeding, but has not had a BM today.  No nausea or vomiting and no abdominal pain    Current Facility-Administered Medications:     abiraterone acetate (ZYTIGA) tablet 1,000 mg (HOME MED), 1,000 mg, Oral, Nightly, Devi Mccarty MD    acetaminophen (TYLENOL) tablet 650 mg, 650 mg, Oral, Q4H PRN **OR** acetaminophen (TYLENOL) 160 MG/5ML oral solution 650 mg, 650 mg, Oral, Q4H PRN **OR** acetaminophen (TYLENOL) suppository 650 mg, 650 mg, Rectal, Q4H PRN, Devi Mccarty MD    albuterol (PROVENTIL) nebulizer solution 0.083% 2.5 mg/3mL, 2.5 mg, Nebulization, Q6H PRN, Devi Mccarty MD    atorvastatin (LIPITOR) tablet 80 mg, 80 mg, Oral, Nightly, Devi Mccarty MD, 80 mg at 06/02/25 2135    sennosides-docusate (PERICOLACE) 8.6-50 MG per tablet 2 tablet, 2 tablet, Oral, BID PRN **AND** polyethylene glycol (MIRALAX) packet 17 g, 17 g, Oral, Daily PRN **AND** bisacodyl (DULCOLAX) EC tablet 5 mg, 5 mg, Oral, Daily PRN **AND** bisacodyl (DULCOLAX) suppository 10 mg, 10 mg, Rectal, Daily PRN, Devi Mccarty MD    ferrous sulfate tablet 325 mg, 325 mg, Oral, Daily With Breakfast, Porter Vargas PA, 325 mg at 06/03/25 0839    gabapentin (NEURONTIN) capsule 600 mg, 600 mg, Oral, Q8H, Devi Mccarty MD, 600 mg at 06/03/25 1416    melatonin tablet 5 mg, 5 mg, Oral, Nightly PRN, Devi Mccarty MD    morphine injection 1 mg, 1 mg, Intravenous, Q2H PRN, Devi Mccarty MD, 1 mg at 06/03/25 1415    [DISCONTINUED] HYDROmorphone (DILAUDID) injection 0.5 mg, 0.5 mg, Intravenous, Q2H PRN, 0.5 mg at 05/30/25 0743 **AND** naloxone (NARCAN) injection 0.4 mg, 0.4 mg, Intravenous, Q5 Min PRN, Devi Mccarty MD    ondansetron ODT (ZOFRAN-ODT) disintegrating  tablet 4 mg, 4 mg, Oral, Q6H PRN **OR** ondansetron (ZOFRAN) injection 4 mg, 4 mg, Intravenous, Q6H PRN, Devi Mccarty MD, 4 mg at 05/30/25 1337    oxyCODONE-acetaminophen (PERCOCET) 5-325 MG per tablet 1 tablet, 1 tablet, Oral, Q8H PRN, Devi Mccarty MD    oxyCODONE-acetaminophen (PERCOCET) 5-325 MG per tablet 1 tablet, 1 tablet, Oral, Q6H PRN, Devi Mccarty MD, 1 tablet at 05/30/25 2204    pantoprazole (PROTONIX) injection 40 mg, 40 mg, Intravenous, Q12H, Devi Mccarty MD, 40 mg at 06/03/25 0839    predniSONE (DELTASONE) tablet 5 mg, 5 mg, Oral, Nightly, Porter Vargas, PA, 5 mg at 06/02/25 2135    prochlorperazine (COMPAZINE) injection 5 mg, 5 mg, Intravenous, Q6H PRN, 5 mg at 05/31/25 0113 **OR** prochlorperazine (COMPAZINE) tablet 5 mg, 5 mg, Oral, Q6H PRN **OR** prochlorperazine (COMPAZINE) suppository 25 mg, 25 mg, Rectal, Q12H PRN, Devi Mccarty MD    sodium chloride 0.9 % flush 10 mL, 10 mL, Intravenous, PRN, Devi Mccarty MD    sodium chloride 0.9 % flush 10 mL, 10 mL, Intravenous, Q12H, Devi Mccarty MD, 10 mL at 06/03/25 0840    sodium chloride 0.9 % flush 10 mL, 10 mL, Intravenous, PRN, Devi Mccarty MD    sodium chloride 0.9 % infusion 40 mL, 40 mL, Intravenous, PRN, Devi Mccarty MD  Review of Systems:    Pertinent items are noted in HPI    Objective     Vital Signs  Temp:  [97.9 °F (36.6 °C)-99.3 °F (37.4 °C)] 97.9 °F (36.6 °C)  Heart Rate:  [] 74  Resp:  [18-20] 18  BP: ()/(60-85) 116/85  Body mass index is 26.61 kg/m².    Intake/Output Summary (Last 24 hours) at 6/3/2025 1457  Last data filed at 6/3/2025 1426  Gross per 24 hour   Intake 1144.58 ml   Output 325 ml   Net 819.58 ml     I/O this shift:  In: 420 [P.O.:420]  Out: -      Physical Exam:   General: awake, alert and in no acute distress   Eyes: eyes move symmetrical in all directions, no scleral icterus   Neck: supple, trachea is  "midline   Skin: warm and dry, not jaundiced   Cardiovascular: regular rhythm and rate   Pulm: breathing unlabored   Abdomen: soft, non tender, non distended   Rectal: deferred   Extremities: no rash or edema   Psychiatric: mental status within normal limits, alert and oriented      Results Review:     I reviewed the patient's new clinical results.    Results from last 7 days   Lab Units 06/03/25  0750 06/02/25  2351 06/02/25  1535 06/02/25  0803 06/01/25  0844 06/01/25  0512   WBC 10*3/mm3 5.15  --   --  6.21  --  7.79   HEMOGLOBIN g/dL 7.5*  7.5* 6.9* 7.3* 7.3*  7.3*   < > 8.6*   HEMATOCRIT % 23.1*  23.1* 20.7* 22.3* 21.7*  21.7*   < > 26.8*   PLATELETS 10*3/mm3 169  --   --  147  --  136*    < > = values in this interval not displayed.     Results from last 7 days   Lab Units 06/03/25  0750 06/02/25  0803 06/01/25  0512 05/30/25  0828 05/29/25  1625   SODIUM mmol/L 139 140 140   < > 142   POTASSIUM mmol/L 3.4* 3.6 3.8   < > 3.6   CHLORIDE mmol/L 111* 112* 111*   < > 111*   CO2 mmol/L 20.5* 20.8* 17.7*   < > 20.2*   BUN mg/dL 15.3 15.4 18.2   < > 35.5*   CREATININE mg/dL 0.95 0.93 1.07   < > 1.21   CALCIUM mg/dL 7.3* 7.6* 7.8*   < > 8.8   BILIRUBIN mg/dL  --  0.4  --   --  0.4   ALK PHOS U/L  --  35*  --   --  41   ALT (SGPT) U/L  --  14  --   --  11   AST (SGOT) U/L  --  22  --   --  16   GLUCOSE mg/dL 119* 109* 96   < > 109*    < > = values in this interval not displayed.     Results from last 7 days   Lab Units 05/29/25  1625   INR  1.13     No results found for: \"LIPASE\"    Radiology:    No radiology results for the last day      Assessment & Plan   Assessment:   Rectal bleeding      Plan:   Patient's hgb dropped from 7.3 to 6.9-he received 1 unit of PRBC's and his hgb today is 7.5  Continue to monitor H & H   Advised patient we wouldn't recommend capsule endoscopy due to his previous trauma and difficulty with abdominal surgeries  Patient understands and agrees to the plan    I discussed the patients " findings and my recommendations with patient.      Electronically signed by JIMI Sparrow, 6/3/2025, 14:57 EDT.

## 2025-06-04 ENCOUNTER — RESULTS FOLLOW-UP (OUTPATIENT)
Dept: GASTROENTEROLOGY | Facility: HOSPITAL | Age: 80
End: 2025-06-04
Payer: MEDICARE

## 2025-06-04 LAB
ANION GAP SERPL CALCULATED.3IONS-SCNC: 8.8 MMOL/L (ref 5–15)
BH BB BLOOD EXPIRATION DATE: NORMAL
BH BB BLOOD TYPE BARCODE: 5100
BH BB DISPENSE STATUS: NORMAL
BH BB PRODUCT CODE: NORMAL
BH BB UNIT NUMBER: NORMAL
BUN SERPL-MCNC: 11.1 MG/DL (ref 8–23)
BUN/CREAT SERPL: 12.6 (ref 7–25)
CALCIUM SPEC-SCNC: 7.7 MG/DL (ref 8.6–10.5)
CHLORIDE SERPL-SCNC: 110 MMOL/L (ref 98–107)
CO2 SERPL-SCNC: 21.2 MMOL/L (ref 22–29)
CREAT SERPL-MCNC: 0.88 MG/DL (ref 0.76–1.27)
CROSSMATCH INTERPRETATION: NORMAL
EGFRCR SERPLBLD CKD-EPI 2021: 88.6 ML/MIN/1.73
FOLATE SERPL-MCNC: 15.4 NG/ML (ref 4.78–24.2)
GLUCOSE SERPL-MCNC: 114 MG/DL (ref 65–99)
HCT VFR BLD AUTO: 24.3 % (ref 37.5–51)
HCT VFR BLD AUTO: 24.3 % (ref 37.5–51)
HCT VFR BLD AUTO: 26.9 % (ref 37.5–51)
HGB BLD-MCNC: 7.9 G/DL (ref 13–17.7)
HGB BLD-MCNC: 7.9 G/DL (ref 13–17.7)
HGB BLD-MCNC: 8.7 G/DL (ref 13–17.7)
IRON 24H UR-MRATE: 194 MCG/DL (ref 59–158)
IRON SATN MFR SERPL: 61 % (ref 20–50)
POTASSIUM SERPL-SCNC: 3.6 MMOL/L (ref 3.5–5.2)
SODIUM SERPL-SCNC: 140 MMOL/L (ref 136–145)
TIBC SERPL-MCNC: 319 MCG/DL (ref 298–536)
TRANSFERRIN SERPL-MCNC: 214 MG/DL (ref 200–360)
UNIT  ABO: NORMAL
UNIT  RH: NORMAL
VIT B12 BLD-MCNC: >2000 PG/ML (ref 211–946)

## 2025-06-04 PROCEDURE — 80048 BASIC METABOLIC PNL TOTAL CA: CPT | Performed by: INTERNAL MEDICINE

## 2025-06-04 PROCEDURE — 82607 VITAMIN B-12: CPT | Performed by: PHYSICIAN ASSISTANT

## 2025-06-04 PROCEDURE — 85014 HEMATOCRIT: CPT | Performed by: INTERNAL MEDICINE

## 2025-06-04 PROCEDURE — 83540 ASSAY OF IRON: CPT | Performed by: PHYSICIAN ASSISTANT

## 2025-06-04 PROCEDURE — 85018 HEMOGLOBIN: CPT | Performed by: INTERNAL MEDICINE

## 2025-06-04 PROCEDURE — 25010000002 MORPHINE PER 10 MG: Performed by: INTERNAL MEDICINE

## 2025-06-04 PROCEDURE — 82746 ASSAY OF FOLIC ACID SERUM: CPT | Performed by: PHYSICIAN ASSISTANT

## 2025-06-04 PROCEDURE — 84466 ASSAY OF TRANSFERRIN: CPT | Performed by: PHYSICIAN ASSISTANT

## 2025-06-04 PROCEDURE — 63710000001 PREDNISONE PER 5 MG: Performed by: PHYSICIAN ASSISTANT

## 2025-06-04 PROCEDURE — 99233 SBSQ HOSP IP/OBS HIGH 50: CPT | Performed by: INTERNAL MEDICINE

## 2025-06-04 RX ORDER — ACETAMINOPHEN 325 MG/1
650 TABLET ORAL ONCE
Status: DISCONTINUED | OUTPATIENT
Start: 2025-06-04 | End: 2025-06-05 | Stop reason: HOSPADM

## 2025-06-04 RX ADMIN — FERROUS SULFATE TAB 325 MG (65 MG ELEMENTAL FE) 325 MG: 325 (65 FE) TAB at 09:33

## 2025-06-04 RX ADMIN — GABAPENTIN 600 MG: 300 CAPSULE ORAL at 21:40

## 2025-06-04 RX ADMIN — PANTOPRAZOLE SODIUM 40 MG: 40 INJECTION, POWDER, FOR SOLUTION INTRAVENOUS at 09:34

## 2025-06-04 RX ADMIN — SENNOSIDES AND DOCUSATE SODIUM 2 TABLET: 50; 8.6 TABLET ORAL at 16:30

## 2025-06-04 RX ADMIN — MORPHINE SULFATE 1 MG: 2 INJECTION, SOLUTION INTRAMUSCULAR; INTRAVENOUS at 00:00

## 2025-06-04 RX ADMIN — PANTOPRAZOLE SODIUM 40 MG: 40 INJECTION, POWDER, FOR SOLUTION INTRAVENOUS at 21:39

## 2025-06-04 RX ADMIN — OXYCODONE HYDROCHLORIDE AND ACETAMINOPHEN 1 TABLET: 5; 325 TABLET ORAL at 05:09

## 2025-06-04 RX ADMIN — ATORVASTATIN CALCIUM 80 MG: 40 TABLET, FILM COATED ORAL at 21:39

## 2025-06-04 RX ADMIN — Medication 10 ML: at 09:34

## 2025-06-04 RX ADMIN — GABAPENTIN 600 MG: 300 CAPSULE ORAL at 13:56

## 2025-06-04 RX ADMIN — PREDNISONE 5 MG: 10 TABLET ORAL at 21:40

## 2025-06-04 RX ADMIN — MORPHINE SULFATE 1 MG: 2 INJECTION, SOLUTION INTRAMUSCULAR; INTRAVENOUS at 04:33

## 2025-06-04 RX ADMIN — OXYCODONE HYDROCHLORIDE AND ACETAMINOPHEN 1 TABLET: 5; 325 TABLET ORAL at 22:03

## 2025-06-04 RX ADMIN — GABAPENTIN 600 MG: 300 CAPSULE ORAL at 05:09

## 2025-06-04 RX ADMIN — OXYCODONE HYDROCHLORIDE AND ACETAMINOPHEN 1 TABLET: 5; 325 TABLET ORAL at 16:30

## 2025-06-04 RX ADMIN — Medication 10 ML: at 21:39

## 2025-06-04 NOTE — PLAN OF CARE
Goal Outcome Evaluation:              Outcome Evaluation: A&Ox4. Room air. Ambulates the diallo. Treated for pain, see MAR. No complaints at this time. Call light in reach. PRN bowel regimen initiated.

## 2025-06-04 NOTE — PLAN OF CARE
Problem: Adult Inpatient Plan of Care  Goal: Plan of Care Review  Outcome: Progressing  Flowsheets (Taken 6/4/2025 3235)  Progress: improving  Outcome Evaluation: Patient alert and oriented. VSS. On room air. No s/s of distress. C/o abdomen pain. PRN Medications given. Pain could be better controlled.  Able to sleep in between care. Call light within reach. Care plan ongoing  Plan of Care Reviewed With: patient   Goal Outcome Evaluation:  Plan of Care Reviewed With: patient

## 2025-06-04 NOTE — PROGRESS NOTES
[facility]   Hospitalist Progress Note       Patient Name: Dwight Gooden  : 1945  MRN: [id number]  Primary Care Physician: Elías Quintana MD  Date of admission: 2025  Today's Date: 2025  Room / Bed:   [facility]  Subjective   Chief Complaint: Weakness    Summary:  Patient is a 79-year-old male who presents to the ER for weakness and shortness of breath that has been going on for 2 weeks.  However the intensity of his symptoms increased today.  Patient does report some bloody stool and some abdominal pain.  Patient was recently started on diclofenac and tramadol 1 month ago.  On arrival to the ED, patient had temperature 98.1, pulse of 99, respiratory rate of 18, blood pressure of 86/63, and he saturating 100% on room air.   On labs, patient's troponin is 13, sodium is 142, chloride is 111, glucose is 109, pro time is 1.13.  Hemoglobin is 5.0.  MCV is 98.1.       Interval Followup:   Patient seen and examined resting comfortably H&H remained stable continue to monitor transfuse for hemoglobin less than 7 check iron profile check B12 folate levels  Objective   Temp:  [97.3 °F (36.3 °C)-99.3 °F (37.4 °C)] 97.3 °F (36.3 °C)  Heart Rate:  [73-97] 73  Resp:  [16-18] 18  BP: (107-140)/(75-97) 121/80  Flow (L/min) (Oxygen Therapy):  [0] 0  PHYSICAL EXAM   Constitutional: Awake alert oriented  Respiratory: Clear  Cardiovascular RRR  GI: Soft nontender       Results from last 7 days   Lab Units 25  0732 25  0000 25  0750 25  2351 25  1535 25  0803 25  2348 25  0844 25  0512 25  1515 25  0536 25  0828 25  1625   WBC 10*3/mm3  --   --  5.15  --   --  6.21  --   --  7.79  --  8.04 6.90 7.02   HEMOGLOBIN g/dL 7.9* 7.9* 7.5*  7.5* 6.9* 7.3* 7.3*  7.3* 7.8*   < > 8.6*   < > 6.2* 7.4* 5.0*   HEMATOCRIT % 24.3* 24.3* 23.1*  23.1* 20.7* 22.3* 21.7*  21.7* 24.2*   < > 26.8*   < > 18.6* 22.4* 15.7*   PLATELETS 10*3/mm3  --   --  169   --   --  147  --   --  136*  --  115* 123* 165    < > = values in this interval not displayed.     Results from last 7 days   Lab Units 06/04/25  0732 06/03/25  0750 06/02/25  0803 06/01/25  0512 05/31/25  0536 05/30/25  0828 05/29/25  1625   SODIUM mmol/L 140 139 140 140 139 141 142   POTASSIUM mmol/L 3.6 3.4* 3.6 3.8 3.1* 3.7 3.6   CO2 mmol/L 21.2* 20.5* 20.8* 17.7* 18.4* 16.9* 20.2*   CHLORIDE mmol/L 110* 111* 112* 111* 113* 116* 111*   ANION GAP mmol/L 8.8 7.5 7.2 11.3 7.6 8.1 10.8   BUN mg/dL 11.1 15.3 15.4 18.2 21.6 29.9* 35.5*   CREATININE mg/dL 0.88 0.95 0.93 1.07 1.15 1.13 1.21   GLUCOSE mg/dL 114* 119* 109* 96 133* 145* 109*     Results from last 7 days   Lab Units 05/29/25  1625   INR  1.13     Assessment / Plan   Assessment:    Anemia  GI bleeding  Hypokalemia  Hx prostate cancer  Hx neuropathy  Hx HTN with recent hypotensive readings  Status post EGD 5/30, with argon plasma coagulation of nonbleeding duodenal angioectasia  Status post colonoscopy 6/1, poor prep with findings of resolved diverticular bleed     Plan:    Monitored bed.  Continue to monitor blood counts closely.  Transfuse Hgb < 7 or acute bleeding.  Can DC scheduled H&H recheck CBC in a.m.  Appreciate gastroenterology consult/evaluation  Status post EGD and colonoscopy  Twice daily PPI  Advance diet  Holding home NSAIDs/Celebrex  Continue home meds for prostate cancer (Zytiga, prednisone)  Avoid NSAIDs/AC/antiplatelets  Replace electrolytes/potassium as needed  Continue home medications, but holding antihypertensives in the setting of soft BP    Discussed plan with RN.  VTE Prophylaxis:  Mechanical VTE prophylaxis orders are present.      CODE STATUS:      Code Status (Patient has no pulse and is not breathing): CPR (Attempt to Resuscitate)  Medical Interventions (Patient has pulse or is breathing): Full Support  Level Of Support Discussed With: Patient  Electronically signed by ESA Dunn, 06/04/25, 4:58 PM EDT.         Attending  Documentation:  Patient independently seen and evaluated, above documentation reflects plan put forth during bedside rounds.  More than 51% of the time of this patient encounter was performed by me. I discussed the care plan with ALEXANDRA Elizabeth PA-C, I agree with his findings and plan as documented, what I have added to the care plan and modified is as follows in my documentation and my medical decision making; very pleasant 79-year-old male presenting with weakness and shortness of breath, was found to be severely anemic with a hemoglobin of 5.  He has gotten several units of blood, hemoglobin was 6.9 yesterday morning, got 1 unit of blood and it went up to 7.5.  It is stable at 7.9 today.  Will discontinue every 8 hemoglobins and recheck in the morning.  He has had no further signs of bleeding.  I suspect he can discharge home tomorrow if hemoglobin stable and no new issues.  Electronically signed by Manoj Rios MD, 06/04/25, 6:08 PM EDT.

## 2025-06-05 VITALS
RESPIRATION RATE: 16 BRPM | TEMPERATURE: 98.3 F | SYSTOLIC BLOOD PRESSURE: 124 MMHG | BODY MASS INDEX: 26.52 KG/M2 | WEIGHT: 175 LBS | OXYGEN SATURATION: 97 % | HEART RATE: 74 BPM | HEIGHT: 68 IN | DIASTOLIC BLOOD PRESSURE: 75 MMHG

## 2025-06-05 LAB
ALBUMIN SERPL-MCNC: 2.8 G/DL (ref 3.5–5.2)
ANION GAP SERPL CALCULATED.3IONS-SCNC: 9.5 MMOL/L (ref 5–15)
BUN SERPL-MCNC: 9.9 MG/DL (ref 8–23)
BUN/CREAT SERPL: 9.9 (ref 7–25)
CALCIUM SPEC-SCNC: 8.2 MG/DL (ref 8.6–10.5)
CHLORIDE SERPL-SCNC: 112 MMOL/L (ref 98–107)
CO2 SERPL-SCNC: 21.5 MMOL/L (ref 22–29)
CREAT SERPL-MCNC: 1 MG/DL (ref 0.76–1.27)
DEPRECATED RDW RBC AUTO: 48.3 FL (ref 37–54)
EGFRCR SERPLBLD CKD-EPI 2021: 77.5 ML/MIN/1.73
ERYTHROCYTE [DISTWIDTH] IN BLOOD BY AUTOMATED COUNT: 14.7 % (ref 12.3–15.4)
GLUCOSE SERPL-MCNC: 128 MG/DL (ref 65–99)
HCT VFR BLD AUTO: 23.7 % (ref 37.5–51)
HCT VFR BLD AUTO: 25.4 % (ref 37.5–51)
HGB BLD-MCNC: 7.8 G/DL (ref 13–17.7)
HGB BLD-MCNC: 8.3 G/DL (ref 13–17.7)
MAGNESIUM SERPL-MCNC: 1.8 MG/DL (ref 1.6–2.4)
MCH RBC QN AUTO: 29.9 PG (ref 26.6–33)
MCHC RBC AUTO-ENTMCNC: 32.9 G/DL (ref 31.5–35.7)
MCV RBC AUTO: 90.8 FL (ref 79–97)
PHOSPHATE SERPL-MCNC: 4 MG/DL (ref 2.5–4.5)
PLATELET # BLD AUTO: 246 10*3/MM3 (ref 140–450)
PMV BLD AUTO: 9.2 FL (ref 6–12)
POTASSIUM SERPL-SCNC: 3.7 MMOL/L (ref 3.5–5.2)
RBC # BLD AUTO: 2.61 10*6/MM3 (ref 4.14–5.8)
SODIUM SERPL-SCNC: 143 MMOL/L (ref 136–145)
WBC NRBC COR # BLD AUTO: 4.94 10*3/MM3 (ref 3.4–10.8)

## 2025-06-05 PROCEDURE — 85014 HEMATOCRIT: CPT | Performed by: PHYSICIAN ASSISTANT

## 2025-06-05 PROCEDURE — 85018 HEMOGLOBIN: CPT | Performed by: PHYSICIAN ASSISTANT

## 2025-06-05 PROCEDURE — 99239 HOSP IP/OBS DSCHRG MGMT >30: CPT | Performed by: INTERNAL MEDICINE

## 2025-06-05 PROCEDURE — 80069 RENAL FUNCTION PANEL: CPT | Performed by: PHYSICIAN ASSISTANT

## 2025-06-05 PROCEDURE — 83735 ASSAY OF MAGNESIUM: CPT | Performed by: PHYSICIAN ASSISTANT

## 2025-06-05 PROCEDURE — 85027 COMPLETE CBC AUTOMATED: CPT | Performed by: PHYSICIAN ASSISTANT

## 2025-06-05 RX ORDER — PANTOPRAZOLE SODIUM 40 MG/1
40 TABLET, DELAYED RELEASE ORAL 2 TIMES DAILY
Qty: 60 TABLET | Refills: 0 | Status: SHIPPED | OUTPATIENT
Start: 2025-06-05 | End: 2025-07-05

## 2025-06-05 RX ORDER — TRAMADOL HYDROCHLORIDE 50 MG/1
50 TABLET ORAL 2 TIMES DAILY PRN
Qty: 6 TABLET | Refills: 0 | Status: SHIPPED | OUTPATIENT
Start: 2025-06-05 | End: 2025-06-08

## 2025-06-05 RX ADMIN — FERROUS SULFATE TAB 325 MG (65 MG ELEMENTAL FE) 325 MG: 325 (65 FE) TAB at 08:25

## 2025-06-05 RX ADMIN — OXYCODONE HYDROCHLORIDE AND ACETAMINOPHEN 1 TABLET: 5; 325 TABLET ORAL at 04:01

## 2025-06-05 RX ADMIN — GABAPENTIN 600 MG: 300 CAPSULE ORAL at 14:10

## 2025-06-05 RX ADMIN — PANTOPRAZOLE SODIUM 40 MG: 40 INJECTION, POWDER, FOR SOLUTION INTRAVENOUS at 08:25

## 2025-06-05 RX ADMIN — OXYCODONE HYDROCHLORIDE AND ACETAMINOPHEN 1 TABLET: 5; 325 TABLET ORAL at 10:20

## 2025-06-05 RX ADMIN — OXYCODONE HYDROCHLORIDE AND ACETAMINOPHEN 1 TABLET: 5; 325 TABLET ORAL at 16:38

## 2025-06-05 RX ADMIN — Medication 10 ML: at 08:24

## 2025-06-05 RX ADMIN — GABAPENTIN 600 MG: 300 CAPSULE ORAL at 05:36

## 2025-06-05 NOTE — PLAN OF CARE
Problem: Adult Inpatient Plan of Care  Goal: Plan of Care Review  Outcome: Progressing  Flowsheets (Taken 6/5/2025 6884)  Progress: improving  Outcome Evaluation: Patient alert and oriented. VSS. On room air. No s/s of distress. C/o abdomen pain. PRN Medications given. Pain not well controlled. MD notified. Refused Miralax. Possible discharge today. Hemoglobin stable. Able to sleep in between care. Call light within reach. Care plan ongoing  Plan of Care Reviewed With: patient   Goal Outcome Evaluation:  Plan of Care Reviewed With: patient

## 2025-06-05 NOTE — DISCHARGE SUMMARY
[facility]         HOSPITALIST  DISCHARGE SUMMARY    Patient Name: Dwight Gooden  : 1945  MRN: [id number]    Date of Admission: 2025  Date of Discharge: 2025  Primary Care Physician: Elías Quintana MD  Reason for admission:  Weakness shortness of breath    Final diagnosis:  Anemia  GI bleeding  Hypokalemia  Hx prostate cancer  Hx neuropathy  Hx HTN with recent hypotensive readings  Status post EGD , with argon plasma coagulation of nonbleeding duodenal angioectasia  Status post colonoscopy , poor prep with findings of resolved diverticular bleed     Consults       Date and Time Order Name Status Description    2025  5:07 PM Hospitalist (on-call MD unless specified)      2025  5:07 PM Gastroenterology (on-call MD unless specified) Completed             Active and Resolved Hospital Problems:  Active Hospital Problems    Diagnosis POA    **GI bleed [K92.2] Yes    Acute blood loss anemia [D62] Unknown      Resolved Hospital Problems   No resolved problems to display.       Hospital Course     Hospital Course:  Dwight Gooden is a 79 y.o. male who presents to the ER for weakness and shortness of breath that has been going on for 2 weeks.  However the intensity of his symptoms increased today.  Patient does report some bloody stool and some abdominal pain.  Patient was recently started on diclofenac and tramadol 1 month ago.  On arrival to the ED, patient had temperature 98.1, pulse of 99, respiratory rate of 18, blood pressure of 86/63, and he saturating 100% on room air.   On labs, patient's troponin is 13, sodium is 142, chloride is 111, glucose is 109, pro time is 1.13.  Hemoglobin is 5.0.  MCV is 98.1.  Patient admitted to hospitalist service transfuse 3 units of PRBC started on Protonix and octreotide drip GI consulted underwent EGD showed nonbleeding duodenal angiectasiaTreated with argon plasma coagulation also  underwent colonoscopy poor prep but  findings noted of resolved diverticular bleed patient has been prophylaxis for diet has been advanced H&H has remained stable will discharge home with orders to follow-up with PCP and GI.  Patient should return to ED for worsening symptoms        DISCHARGE Follow Up Recommendations for labs and diagnostics: As above      Day of Discharge     Vital Signs:  Temp:  [97.6 °F (36.4 °C)-100.2 °F (37.9 °C)] 98.5 °F (36.9 °C)  Heart Rate:  [70-90] 72  Resp:  [12-18] 16  BP: ()/(70-95) 113/76  Flow (L/min) (Oxygen Therapy):  [0] 0  Physical Exam:   Constitutional: Awake alert no acute distress  Respiratory: Clear  Cardiovascular RRR  GI: Abdomen soft      Discharge Details        Discharge Medications        Continue These Medications        Instructions Start Date   albuterol sulfate  (90 Base) MCG/ACT inhaler  Commonly known as: PROVENTIL HFA;VENTOLIN HFA;PROAIR HFA   2 puffs, Inhalation, Every 4 Hours PRN      atorvastatin 80 MG tablet  Commonly known as: LIPITOR   80 mg, Oral, Daily      diphenhydrAMINE 25 mg capsule  Commonly known as: BENADRYL   25 mg, Oral, Nightly PRN      gabapentin 600 MG tablet  Commonly known as: NEURONTIN   1,200 mg, 3 Times Daily      traMADol 50 MG tablet  Commonly known as: ULTRAM   100 mg, 3 Times Daily             Stop These Medications      DICLOFENAC PO            ASK your doctor about these medications        Instructions Start Date   abiraterone acetate 250 MG tablet  Commonly known as: ZYTIGA   1,000 mg, Nightly      Bismuth/Metronidaz/Tetracyclin 140-125-125 MG capsule  Commonly known as: Pylera   3 capsules, Oral, 4 Times Daily Before Meals & Nightly      NIFEdipine CC 30 MG 24 hr tablet  Commonly known as: ADALAT CC   30 mg, Daily      predniSONE 5 MG tablet  Commonly known as: DELTASONE   5 mg, Nightly               Allergies   Allergen Reactions    Augmentin [Amoxicillin-Pot Clavulanate] Swelling     lips    Lisinopril Swelling and Cough       Discharge  Disposition:      Diet:  Hospital:  Diet Order   Procedures    Diet: Regular/House; Fluid Consistency: Thin (IDDSI 0)       Discharge Activity:       CODE STATUS:  Code Status and Medical Interventions: CPR (Attempt to Resuscitate); Full Support   Ordered at: 05/29/25 0295     Code Status (Patient has no pulse and is not breathing):    CPR (Attempt to Resuscitate)     Medical Interventions (Patient has pulse or is breathing):    Full Support     Level Of Support Discussed With:    Patient         No future appointments.        Pertinent  and/or Most Recent Results     PROCEDURES:   EGD   colonoscopy    LAB RESULTS:      Lab 06/05/25  0534 06/04/25  1544 06/04/25  0732 06/04/25  0000 06/03/25  0750 06/02/25  1535 06/02/25  0803 06/01/25  0844 06/01/25  0512 05/31/25  1515 05/31/25  0536 05/30/25  0828 05/29/25  1625   WBC 4.94  --   --   --  5.15  --  6.21  --  7.79  --  8.04 6.90 7.02   HEMOGLOBIN 7.8* 8.7* 7.9* 7.9* 7.5*  7.5*   < > 7.3*  7.3*   < > 8.6*   < > 6.2* 7.4* 5.0*   HEMATOCRIT 23.7* 26.9* 24.3* 24.3* 23.1*  23.1*   < > 21.7*  21.7*   < > 26.8*   < > 18.6* 22.4* 15.7*   PLATELETS 246  --   --   --  169  --  147  --  136*  --  115* 123* 165   NEUTROS ABS  --   --   --   --   --   --   --   --  5.91  --  5.46 4.22 5.08   IMMATURE GRANS (ABS)  --   --   --   --   --   --   --   --  0.02  --  0.03 0.04 0.04   LYMPHS ABS  --   --   --   --   --   --   --   --  1.13  --  1.53 1.73 1.35   MONOS ABS  --   --   --   --   --   --   --   --  0.61  --  0.73 0.65 0.48   EOS ABS  --   --   --   --   --   --   --   --  0.09  --  0.28 0.22 0.06   MCV 90.8  --   --   --  90.6  --  88.6  --  89.6  --  91.2 90.7 98.1*   PROTIME  --   --   --   --   --   --   --   --   --   --   --   --  15.0*    < > = values in this interval not displayed.         Lab 06/05/25  0534 06/04/25  0732 06/03/25  0750 06/02/25  0803 06/01/25  0512 05/31/25  0536 05/30/25  0828 05/29/25  1625   SODIUM 143 140 139 140 140 139 141 142    POTASSIUM 3.7 3.6 3.4* 3.6 3.8 3.1* 3.7 3.6   CHLORIDE 112* 110* 111* 112* 111* 113* 116* 111*   CO2 21.5* 21.2* 20.5* 20.8* 17.7* 18.4* 16.9* 20.2*   ANION GAP 9.5 8.8 7.5 7.2 11.3 7.6 8.1 10.8   BUN 9.9 11.1 15.3 15.4 18.2 21.6 29.9* 35.5*   CREATININE 1.00 0.88 0.95 0.93 1.07 1.15 1.13 1.21   EGFR 77.5 88.6 82.4 84.6 71.5 65.5 66.9 61.7   GLUCOSE 128* 114* 119* 109* 96 133* 145* 109*   CALCIUM 8.2* 7.7* 7.3* 7.6* 7.8* 7.0* 7.4* 8.8   MAGNESIUM 1.8  --   --  1.8  --   --   --  2.0   PHOSPHORUS 4.0  --   --  3.6  --  3.1 2.2* 2.3*         Lab 06/05/25  0534 06/02/25  0803 05/29/25  1625   TOTAL PROTEIN  --  4.2* 5.3*   ALBUMIN 2.8* 2.7* 3.6   GLOBULIN  --  1.5 1.7   ALT (SGPT)  --  14 11   AST (SGOT)  --  22 16   BILIRUBIN  --  0.4 0.4   ALK PHOS  --  35* 41         Lab 05/29/25  1753 05/29/25  1625   HSTROP T 12 13   PROTIME  --  15.0*   INR  --  1.13             Lab 06/04/25  1544 06/03/25  0057 05/29/25  1708   IRON 194*  --   --    IRON SATURATION (TSAT) 61*  --   --    TIBC 319  --   --    TRANSFERRIN 214  --   --    FOLATE 15.40  --   --    VITAMIN B 12 >2,000*  --   --    ABO TYPING  --  O O   RH TYPING  --  Positive Positive   ANTIBODY SCREEN  --  Negative Negative         Brief Urine Lab Results  (Last result in the past 365 days)        Color   Clarity   Blood   Leuk Est   Nitrite   Protein   CREAT   Urine HCG        05/29/25 1602 Yellow   Clear   Negative   Negative   Negative   Negative                 Microbiology Results (last 10 days)       ** No results found for the last 240 hours. **            XR Chest 1 View  Result Date: 5/29/2025  Impression: Impression: No radiographic evidence of acute pulmonary process Electronically Signed: Cuong Amaral MD  5/29/2025 3:05 PM EDT  Workstation ID: [id number]                  Labs Pending at Discharge:        Time spent on Discharge including face to face service: 35 minutes    Electronically signed by ESA Dunn, 06/05/25, 11:34 AM  EDT.      Attending Documentation:  Patient independently seen and evaluated, above documentation reflects plan put forth during bedside rounds.  More than 51% of the time of this patient encounter was performed by me. I discussed the care plan with ALEXANDRA Elizabeth PA-C, I agree with his findings and plan as documented, what I have added to the care plan and modified is as follows in my documentation and my medical decision making; very pleasant 79-year-old male presents with weakness and shortness of breath, hemoglobin in 5's, has gotten few units of blood, hemoglobin stable today.  Seen by GI, EGD 5/30/2025 with argon plasma coagulation and nonbleeding duodenal angioectasia.  Doing well today, no bleeding.  Will discharge home.  Electronically signed by Manoj Rios MD, 06/05/25, 2:28 PM EDT.

## 2025-06-05 NOTE — CONSULTS
Nutrition Services    Patient Name: Dwight Gooden  YOB: 1945  MRN: [id number]  Admission date: 5/29/2025    NUTRITION SCREENING      Encounter Information: Patient assessed by RD for LOS. Patient is at low risk per Malnutrition Screening Tool (MST).      Patient is Aa0x4 upon RD visit NAD.  Patient is pleasant and conversant during encounter. Pt appreciative of  staff  Pt reports 100% intake of morning meal tray.  Denies any NVDC       PO Diet: Diet: Regular/House; Fluid Consistency: Thin (IDDSI 0)   PO Supplements:    PO Intake:  100%       Labs (reviewed below):  Reviewed       GI Function:   6/01       Skin:  intact       Weight Hx Review:  WNL       Nutrition Intervention: No acute interventions at this time. RD will continue to follow per protocol.       Results from last 7 days   Lab Units 06/05/25  0534 06/04/25  0732 06/03/25  0750 06/02/25  0803 05/30/25  0828 05/29/25  1625   SODIUM mmol/L 143 140 139 140   < > 142   POTASSIUM mmol/L 3.7 3.6 3.4* 3.6   < > 3.6   CHLORIDE mmol/L 112* 110* 111* 112*   < > 111*   CO2 mmol/L 21.5* 21.2* 20.5* 20.8*   < > 20.2*   BUN mg/dL 9.9 11.1 15.3 15.4   < > 35.5*   CREATININE mg/dL 1.00 0.88 0.95 0.93   < > 1.21   CALCIUM mg/dL 8.2* 7.7* 7.3* 7.6*   < > 8.8   BILIRUBIN mg/dL  --   --   --  0.4  --  0.4   ALK PHOS U/L  --   --   --  35*  --  41   ALT (SGPT) U/L  --   --   --  14  --  11   AST (SGOT) U/L  --   --   --  22  --  16   GLUCOSE mg/dL 128* 114* 119* 109*   < > 109*    < > = values in this interval not displayed.     Results from last 7 days   Lab Units 06/05/25  1151 06/05/25  0534 06/02/25  1535 06/02/25  0803 05/30/25  0828 05/29/25  1625   MAGNESIUM mg/dL  --  1.8  --  1.8  --  2.0   PHOSPHORUS mg/dL  --  4.0  --  3.6   < > 2.3*   HEMOGLOBIN g/dL 8.3* 7.8*   < > 7.3*  7.3*   < > 5.0*   HEMATOCRIT % 25.4* 23.7*   < > 21.7*  21.7*   < > 15.7*    < > = values in this interval not displayed.     COVID19   Date Value Ref Range  "Status   04/08/2025 Not Detected Not Detected - Ref. Range Final     No results found for: \"HGBA1C\"    RD to follow up per protocol.    Electronically signed by:  Derick Dong RD  06/05/25 14:59 EDT   "

## 2025-06-05 NOTE — DISCHARGE INSTR - LAB
PCP Elías Quintana MD unavailable for 1 week follow up appointment    DISCHARGE FOLLOW UP WITH:  MD Diana    ON:  Friday June 13th at 1530

## 2025-06-05 NOTE — PLAN OF CARE
Goal Outcome Evaluation:  Plan of Care Reviewed With: patient        Progress: improving  Outcome Evaluation: Patient alert and oriented x4, Hemoglobin 7.8, percocet 5-325mg given for pain 10/10 with some minimal relief per patient report. No other distress or needs identified. Patient tolerating ambulation and self care good. No BM since 6-1. No other patient complaints identified. Patient safety ensured, call light in reach.

## 2025-06-06 ENCOUNTER — READMISSION MANAGEMENT (OUTPATIENT)
Dept: CALL CENTER | Facility: HOSPITAL | Age: 80
End: 2025-06-06
Payer: MEDICARE

## 2025-06-06 ENCOUNTER — TELEPHONE (OUTPATIENT)
Dept: GASTROENTEROLOGY | Facility: CLINIC | Age: 80
End: 2025-06-06
Payer: MEDICARE

## 2025-06-06 NOTE — OUTREACH NOTE
Prep Survey      Flowsheet Row Responses   [facility]   Is LACE score < 7 ? No   Eligibility Readm Mgmt   Discharge diagnosis Acute blood loss anemia   Does the patient have one of the following disease processes/diagnoses(primary or secondary)? Other   Prep survey completed? Yes            Anu DORANTES - Registered Nurse

## 2025-06-06 NOTE — TELEPHONE ENCOUNTER
D        Hub staff attempted to follow warm transfer process and was unsuccessful     Caller: Dwight Gooden    Relationship to patient: Self    Best call back number: [phone]    Patient is needing: PT IS RETURNING MISSED CALL. SEE NOTE 06.06.25. PLEASE CONTACT PT TO ASSIST

## 2025-06-11 ENCOUNTER — READMISSION MANAGEMENT (OUTPATIENT)
Dept: CALL CENTER | Facility: HOSPITAL | Age: 80
End: 2025-06-11
Payer: MEDICARE

## 2025-06-11 NOTE — OUTREACH NOTE
Medical Week 1 Survey      Flowsheet Row Responses   [facility]   Does the patient have one of the following disease processes/diagnoses(primary or secondary)? Other   Week 1 attempt successful? No   Unsuccessful attempts Attempt 1  [attempted all numbers.]            Clara BRYANT - Registered Nurse      Clara BRYANT - Registered Nurse

## 2025-06-16 ENCOUNTER — READMISSION MANAGEMENT (OUTPATIENT)
Dept: CALL CENTER | Facility: HOSPITAL | Age: 80
End: 2025-06-16
Payer: MEDICARE

## 2025-06-16 NOTE — OUTREACH NOTE
Medical Week 2 Survey      Flowsheet Row Responses   [facility] patient discharged from? [facility]   Does the patient have one of the following disease processes/diagnoses(primary or secondary)? Other   Week 2 attempt successful? No   Unsuccessful attempts Attempt 1            Mira ENCARNACION - Licensed Nurse

## 2025-06-18 ENCOUNTER — TELEPHONE (OUTPATIENT)
Dept: GASTROENTEROLOGY | Facility: CLINIC | Age: 80
End: 2025-06-18
Payer: MEDICARE

## 2025-06-18 NOTE — LETTER
June 20, 2025    Dwight Gooden  [address]  [place] [address]    6/20/2025          Dwight Gooden  [address]  [place] [address]      Dear Dwight Gooden,    Thank you for choosing [facility]. So far, we've been unsuccessful in connecting with you to discuss the treatment plan recommended by Devi Mccarty MD. It is very important that you contact the office. Not following through with ordered procedures, testing, or follow-up visits could lead to serious complications that may be life-threatening, including a risk of incurable cancer if not found with early detection.    We value you as a patient and look forward to hearing from you.         Sincerely,        [facility]

## 2025-06-18 NOTE — TELEPHONE ENCOUNTER
Patient had Colonoscopy on 06/01/2025 with Dr. Mccarty  Findings included-  Prep was inadequate  Mild diverticulosis in sigmoid colon  Blood in rectum, recto-sigmoid colon, sigmoid colon, descending colon    Findings were suggestive of diverticular bleed  Since prep was poor-discuss with patient if he would like to have colonoscopy for screening purposes in the future

## 2025-06-20 ENCOUNTER — TELEPHONE (OUTPATIENT)
Dept: GASTROENTEROLOGY | Facility: CLINIC | Age: 80
End: 2025-06-20
Payer: MEDICARE

## 2025-06-20 ENCOUNTER — READMISSION MANAGEMENT (OUTPATIENT)
Dept: CALL CENTER | Facility: HOSPITAL | Age: 80
End: 2025-06-20
Payer: MEDICARE

## 2025-06-20 NOTE — TELEPHONE ENCOUNTER
Hub staff attempted to follow warm transfer process and was unsuccessful     Caller: Dwight Gooden    Relationship to patient: Self    Best call back number: [phone]    Patient is needing: PT IS RETURNING MISS CALL FROM [facility] PLEASE GIVE PT A CALL BACK AND IF NOT ABLE TO REACH PT. IT IS OKAY TO M.

## 2025-06-20 NOTE — TELEPHONE ENCOUNTER
S/w pt he states he would like to think about repeating the procedure and will call back if he decides to repeat

## 2025-06-20 NOTE — OUTREACH NOTE
Medical Week 2 Survey      Flowsheet Row Responses   [facility] patient discharged from? [facility]   Does the patient have one of the following disease processes/diagnoses(primary or secondary)? Other   Week 2 attempt successful? Yes   Call start time 1045   Discharge diagnosis Acute blood loss anemia   Call end time 1049   Person spoke with today (if not patient) and relationship Spouse   Meds reviewed with patient/caregiver? Yes   Is the patient having any side effects they believe may be caused by any medication additions or changes? Yes   Side effects comments  Had diarrhea from Pylera but has improved since completeing   Does the patient have all medications ordered at discharge? Yes   Is the patient taking all medications as directed (includes completed medication regime)? Yes   Does the patient have a primary care provider?  Yes   Has the patient kept scheduled appointments due by today? Yes   Psychosocial issues? No   What is the patient's perception of their health status since discharge? Improving   Is the patient/caregiver able to teach back signs and symptoms related to disease process for when to call PCP? Yes   Is the patient/caregiver able to teach back signs and symptoms related to disease process for when to call 911? Yes   Is the patient/caregiver able to teach back the hierarchy of who to call/visit for symptoms/problems? PCP, Specialist, Home health nurse, Urgent Care, ED, 911 Yes   Additional teach back comments Swelling is now gone.  Had f/u with PCP and they did labs and stool sample. He is still weak but getting stronger every day.   Week 2 Call Completed? Yes   Graduated Yes   Graduated/Revoked comments No questions or needs at this time.   Call end time 1049            Mira ENCARNACION - Licensed Nurse

## 2025-07-01 ENCOUNTER — TELEPHONE (OUTPATIENT)
Dept: GASTROENTEROLOGY | Facility: CLINIC | Age: 80
End: 2025-07-01
Payer: MEDICARE

## 2025-07-01 NOTE — TELEPHONE ENCOUNTER
Pt called and lvm in regards to colonoscopy.     Pt would like to repeat his labs prior to scheduling repeat colonoscopy. Pt states he will call back to proceed if his pcp does not call back this afternoon.

## 2025-07-10 ENCOUNTER — PREP FOR SURGERY (OUTPATIENT)
Dept: OTHER | Facility: HOSPITAL | Age: 80
End: 2025-07-10
Payer: MEDICARE

## 2025-07-10 DIAGNOSIS — K57.30 DIVERTICULOSIS OF COLON: ICD-10-CM

## 2025-07-10 DIAGNOSIS — Z12.11 SCREENING FOR COLORECTAL CANCER: Primary | ICD-10-CM

## 2025-07-10 DIAGNOSIS — Z12.12 SCREENING FOR COLORECTAL CANCER: Primary | ICD-10-CM

## 2025-07-10 RX ORDER — SODIUM, POTASSIUM,MAG SULFATES 17.5-3.13G
2 SOLUTION, RECONSTITUTED, ORAL ORAL ONCE
Qty: 354 ML | Refills: 0 | Status: SHIPPED | OUTPATIENT
Start: 2025-07-10 | End: 2025-07-10

## 2025-08-22 ENCOUNTER — TELEPHONE (OUTPATIENT)
Dept: GASTROENTEROLOGY | Facility: CLINIC | Age: 80
End: 2025-08-22
Payer: MEDICARE

## (undated) DEVICE — LINER SURG CANSTR SXN S/RIGD 1500CC

## (undated) DEVICE — SUREFIT, DUAL DISPERSIVE ELECTRODE, CONTACT QUALITY MONITOR: Brand: SUREFIT

## (undated) DEVICE — SOL IRRG H2O PL/BG 1000ML STRL

## (undated) DEVICE — BLCK/BITE BLOX WO/DENTL/RIM W/STRAP 54F

## (undated) DEVICE — CONN JET HYDRA H20 AUXILIARY DISP

## (undated) DEVICE — FIAPC® PROBE W/ FILTER 2200 A OD 2.3MM/6.9FR; L 2.2M/7.2FT: Brand: ERBE

## (undated) DEVICE — Device

## (undated) DEVICE — DEFENDO AIR WATER SUCTION AND BIOPSY VALVE KIT FOR  OLYMPUS: Brand: DEFENDO AIR/WATER/SUCTION AND BIOPSY VALVE

## (undated) DEVICE — SINGLE-USE BIOPSY FORCEPS: Brand: RADIAL JAW 4

## (undated) DEVICE — SOLIDIFIER LIQLOC PLS 1500CC BT

## (undated) DEVICE — THE STERILE LIGHT HANDLE COVER IS USED WITH STERIS SURGICAL LIGHTING AND VISUALIZATION SYSTEMS.